# Patient Record
Sex: FEMALE | HISPANIC OR LATINO | URBAN - METROPOLITAN AREA
[De-identification: names, ages, dates, MRNs, and addresses within clinical notes are randomized per-mention and may not be internally consistent; named-entity substitution may affect disease eponyms.]

---

## 2023-05-10 ENCOUNTER — TELEPHONE (OUTPATIENT)
Dept: PRIMARY CARE | Facility: CLINIC | Age: 58
End: 2023-05-10

## 2023-05-24 ENCOUNTER — OFFICE VISIT (OUTPATIENT)
Dept: PRIMARY CARE | Facility: CLINIC | Age: 58
End: 2023-05-24
Payer: COMMERCIAL

## 2023-05-24 VITALS
TEMPERATURE: 98.6 F | DIASTOLIC BLOOD PRESSURE: 73 MMHG | HEART RATE: 66 BPM | OXYGEN SATURATION: 94 % | WEIGHT: 157 LBS | SYSTOLIC BLOOD PRESSURE: 109 MMHG

## 2023-05-24 DIAGNOSIS — E78.5 HYPERLIPIDEMIA, UNSPECIFIED HYPERLIPIDEMIA TYPE: ICD-10-CM

## 2023-05-24 DIAGNOSIS — F32.A ANXIETY AND DEPRESSION: ICD-10-CM

## 2023-05-24 DIAGNOSIS — I10 HYPERTENSION, UNSPECIFIED TYPE: ICD-10-CM

## 2023-05-24 DIAGNOSIS — Z00.00 HEALTHCARE MAINTENANCE: Primary | ICD-10-CM

## 2023-05-24 DIAGNOSIS — Z13.21 ENCOUNTER FOR VITAMIN DEFICIENCY SCREENING: ICD-10-CM

## 2023-05-24 DIAGNOSIS — Z13.6 SCREENING FOR CARDIOVASCULAR CONDITION: ICD-10-CM

## 2023-05-24 DIAGNOSIS — Z12.31 ENCOUNTER FOR SCREENING MAMMOGRAM FOR MALIGNANT NEOPLASM OF BREAST: ICD-10-CM

## 2023-05-24 DIAGNOSIS — R93.1 ELEVATED CORONARY ARTERY CALCIUM SCORE: ICD-10-CM

## 2023-05-24 DIAGNOSIS — F41.9 ANXIETY AND DEPRESSION: ICD-10-CM

## 2023-05-24 PROBLEM — R07.89 ATYPICAL CHEST PAIN: Status: RESOLVED | Noted: 2023-05-24 | Resolved: 2023-05-24

## 2023-05-24 PROBLEM — R31.29 HEMATURIA, MICROSCOPIC: Status: ACTIVE | Noted: 2023-05-24

## 2023-05-24 PROBLEM — N95.1 MENOPAUSAL FLUSHING: Status: ACTIVE | Noted: 2023-05-24

## 2023-05-24 PROCEDURE — 1036F TOBACCO NON-USER: CPT | Performed by: INTERNAL MEDICINE

## 2023-05-24 PROCEDURE — 3078F DIAST BP <80 MM HG: CPT | Performed by: INTERNAL MEDICINE

## 2023-05-24 PROCEDURE — 3074F SYST BP LT 130 MM HG: CPT | Performed by: INTERNAL MEDICINE

## 2023-05-24 PROCEDURE — 99386 PREV VISIT NEW AGE 40-64: CPT | Performed by: INTERNAL MEDICINE

## 2023-05-24 RX ORDER — NORETHINDRONE ACETATE AND ETHINYL ESTRADIOL .0025; .5 MG/1; MG/1
TABLET, FILM COATED ORAL
COMMUNITY
Start: 2023-03-30

## 2023-05-24 RX ORDER — ASPIRIN 81 MG/1
1 TABLET ORAL DAILY
COMMUNITY
Start: 2018-03-23

## 2023-05-24 RX ORDER — HYDROCHLOROTHIAZIDE 25 MG/1
TABLET ORAL
Qty: 90 TABLET | Refills: 3 | Status: SHIPPED | OUTPATIENT
Start: 2023-05-24 | End: 2023-08-30 | Stop reason: SDUPTHER

## 2023-05-24 RX ORDER — VITAMIN E (DL,TOCOPHERYL ACET) 180 MG
CAPSULE ORAL
COMMUNITY

## 2023-05-24 RX ORDER — LISINOPRIL 30 MG/1
TABLET ORAL
Qty: 90 TABLET | Refills: 1 | Status: SHIPPED | OUTPATIENT
Start: 2023-05-24 | End: 2023-08-30 | Stop reason: SDUPTHER

## 2023-05-24 RX ORDER — LISINOPRIL 30 MG/1
TABLET ORAL
COMMUNITY
Start: 2023-05-20 | End: 2023-05-24 | Stop reason: SDUPTHER

## 2023-05-24 RX ORDER — EPINEPHRINE 0.22MG
AEROSOL WITH ADAPTER (ML) INHALATION
COMMUNITY
Start: 2018-03-22

## 2023-05-24 RX ORDER — ATORVASTATIN CALCIUM 10 MG/1
40 TABLET, FILM COATED ORAL DAILY
COMMUNITY
Start: 2018-03-22 | End: 2023-05-24 | Stop reason: ALTCHOICE

## 2023-05-24 RX ORDER — FENOFIBRATE 48 MG/1
TABLET, FILM COATED ORAL
COMMUNITY
Start: 2023-05-20 | End: 2023-05-24 | Stop reason: SDUPTHER

## 2023-05-24 RX ORDER — HYDROCHLOROTHIAZIDE 25 MG/1
TABLET ORAL
COMMUNITY
Start: 2023-04-03 | End: 2023-05-24 | Stop reason: SDUPTHER

## 2023-05-24 RX ORDER — BUPROPION HYDROCHLORIDE 100 MG/1
TABLET, EXTENDED RELEASE ORAL
COMMUNITY
Start: 2018-03-22 | End: 2023-05-24 | Stop reason: SDUPTHER

## 2023-05-24 RX ORDER — BUPROPION HYDROCHLORIDE 100 MG/1
TABLET ORAL
COMMUNITY
Start: 2023-05-20 | End: 2023-05-24 | Stop reason: ALTCHOICE

## 2023-05-24 RX ORDER — PAROXETINE 30 MG/1
1 TABLET, FILM COATED ORAL DAILY
COMMUNITY
Start: 2018-03-22 | End: 2023-05-24 | Stop reason: SDUPTHER

## 2023-05-24 RX ORDER — ATORVASTATIN CALCIUM 40 MG/1
TABLET, FILM COATED ORAL
COMMUNITY
Start: 2023-05-20 | End: 2023-05-24 | Stop reason: SDUPTHER

## 2023-05-24 RX ORDER — BUPROPION HYDROCHLORIDE 100 MG/1
100 TABLET, EXTENDED RELEASE ORAL DAILY
Qty: 90 TABLET | Refills: 3 | Status: SHIPPED | OUTPATIENT
Start: 2023-05-24 | End: 2023-08-30 | Stop reason: SDUPTHER

## 2023-05-24 RX ORDER — PAROXETINE 30 MG/1
30 TABLET, FILM COATED ORAL DAILY
Qty: 90 TABLET | Refills: 3 | Status: SHIPPED
Start: 2023-05-24 | End: 2023-07-10 | Stop reason: ALTCHOICE

## 2023-05-24 RX ORDER — FENOFIBRATE 48 MG/1
TABLET, FILM COATED ORAL
Qty: 90 TABLET | Refills: 3 | Status: SHIPPED | OUTPATIENT
Start: 2023-05-24 | End: 2023-08-30 | Stop reason: SDUPTHER

## 2023-05-24 RX ORDER — ATORVASTATIN CALCIUM 40 MG/1
40 TABLET, FILM COATED ORAL DAILY
Qty: 90 TABLET | Refills: 1 | Status: SHIPPED | OUTPATIENT
Start: 2023-05-24 | End: 2023-08-30 | Stop reason: SDUPTHER

## 2023-05-24 RX ORDER — MULTIVITAMIN
1 TABLET ORAL
COMMUNITY

## 2023-05-24 ASSESSMENT — COLUMBIA-SUICIDE SEVERITY RATING SCALE - C-SSRS: 1. IN THE PAST MONTH, HAVE YOU WISHED YOU WERE DEAD OR WISHED YOU COULD GO TO SLEEP AND NOT WAKE UP?: NO

## 2023-05-24 ASSESSMENT — ENCOUNTER SYMPTOMS
CARDIOVASCULAR NEGATIVE: 1
EYES NEGATIVE: 1
HEMATOLOGIC/LYMPHATIC NEGATIVE: 1
NEUROLOGICAL NEGATIVE: 1
CONSTITUTIONAL NEGATIVE: 1
PSYCHIATRIC NEGATIVE: 1
GASTROINTESTINAL NEGATIVE: 1
ENDOCRINE NEGATIVE: 1
RESPIRATORY NEGATIVE: 1
MUSCULOSKELETAL NEGATIVE: 1

## 2023-05-24 ASSESSMENT — LIFESTYLE VARIABLES
HOW MANY STANDARD DRINKS CONTAINING ALCOHOL DO YOU HAVE ON A TYPICAL DAY: 1 OR 2
AUDIT-C TOTAL SCORE: 2
SKIP TO QUESTIONS 9-10: 0
HOW OFTEN DO YOU HAVE A DRINK CONTAINING ALCOHOL: MONTHLY OR LESS
HOW OFTEN DO YOU HAVE SIX OR MORE DRINKS ON ONE OCCASION: LESS THAN MONTHLY

## 2023-05-24 ASSESSMENT — PATIENT HEALTH QUESTIONNAIRE - PHQ9
1. LITTLE INTEREST OR PLEASURE IN DOING THINGS: NOT AT ALL
SUM OF ALL RESPONSES TO PHQ9 QUESTIONS 1 & 2: 0
2. FEELING DOWN, DEPRESSED OR HOPELESS: NOT AT ALL

## 2023-05-24 ASSESSMENT — PAIN SCALES - GENERAL: PAINLEVEL: 0-NO PAIN

## 2023-05-24 NOTE — PROGRESS NOTES
Subjective   Patient ID: Elizabeth Milligan is a 57 y.o. female who presents for New Patient Visit and Menopause (Hot flashes/).    HPI       Here for hot  flashes.     No period for 1  year.  Then cam  back x 2  .     Saw gyn.  They recommended us.  She never went.       Review of Systems   Constitutional: Negative.    HENT: Negative.     Eyes: Negative.    Respiratory: Negative.     Cardiovascular: Negative.    Gastrointestinal: Negative.    Endocrine: Negative.    Musculoskeletal: Negative.    Skin: Negative.    Neurological: Negative.    Hematological: Negative.    Psychiatric/Behavioral: Negative.     All other systems reviewed and are negative.      Objective   /73   Pulse 66   Temp 37 °C (98.6 °F)   Wt 71.2 kg (157 lb)   SpO2 94%     Physical Exam  Vitals and nursing note reviewed.   Constitutional:       Appearance: Normal appearance.   HENT:      Head: Normocephalic and atraumatic.      Right Ear: Tympanic membrane, ear canal and external ear normal.      Left Ear: Tympanic membrane, ear canal and external ear normal.      Nose: Nose normal.      Mouth/Throat:      Mouth: Mucous membranes are moist.      Pharynx: Oropharynx is clear.   Eyes:      Extraocular Movements: Extraocular movements intact.      Conjunctiva/sclera: Conjunctivae normal.      Pupils: Pupils are equal, round, and reactive to light.   Cardiovascular:      Rate and Rhythm: Normal rate and regular rhythm.      Pulses: Normal pulses.      Heart sounds: Normal heart sounds.   Pulmonary:      Effort: Pulmonary effort is normal.      Breath sounds: Normal breath sounds.   Abdominal:      General: Abdomen is flat. Bowel sounds are normal.      Palpations: Abdomen is soft.   Musculoskeletal:         General: Normal range of motion.      Cervical back: Neck supple.   Skin:     General: Skin is warm and dry.      Capillary Refill: Capillary refill takes 2 to 3 seconds.   Neurological:      General: No focal deficit present.       Mental Status: She is alert and oriented to person, place, and time. Mental status is at baseline.   Psychiatric:         Mood and Affect: Mood normal.         Behavior: Behavior normal.         Thought Content: Thought content normal.         Judgment: Judgment normal.         Assessment/Plan   Problem List Items Addressed This Visit          Medium    Elevated coronary artery calcium score    Relevant Medications    atorvastatin (Lipitor) 40 mg tablet    fenofibrate (Tricor) 48 mg tablet    lisinopril 30 mg tablet    Other Relevant Orders    CBC    Hyperlipidemia    Relevant Medications    atorvastatin (Lipitor) 40 mg tablet    fenofibrate (Tricor) 48 mg tablet    Other Relevant Orders    CBC    Hypertension    Relevant Medications    hydroCHLOROthiazide (HYDRODiuril) 25 mg tablet    lisinopril 30 mg tablet    Other Relevant Orders    CBC    Anxiety and depression    Relevant Medications    buPROPion SR (Wellbutrin SR) 100 mg 12 hr tablet    PARoxetine (Paxil) 30 mg tablet     Other Visit Diagnoses       Healthcare maintenance    -  Primary    Relevant Orders    Comprehensive Metabolic Panel    Lipid Panel    TSH with reflex to Free T4 if abnormal    Screening for cardiovascular condition        Relevant Orders    Lipid Panel    Encounter for vitamin deficiency screening        Relevant Orders    Vitamin D 1,25 Dihydroxy    Encounter for screening mammogram for malignant neoplasm of breast              Recommend she  follow up with gyn  for  vag  bleeding.

## 2023-07-10 DIAGNOSIS — F32.A ANXIETY AND DEPRESSION: Primary | ICD-10-CM

## 2023-07-10 DIAGNOSIS — F41.9 ANXIETY AND DEPRESSION: Primary | ICD-10-CM

## 2023-07-10 RX ORDER — PAROXETINE HYDROCHLORIDE 20 MG/1
TABLET, FILM COATED ORAL
Qty: 30 TABLET | Refills: 0 | Status: SHIPPED | OUTPATIENT
Start: 2023-07-10 | End: 2023-08-08

## 2023-08-07 DIAGNOSIS — F32.A ANXIETY AND DEPRESSION: ICD-10-CM

## 2023-08-07 DIAGNOSIS — F41.9 ANXIETY AND DEPRESSION: ICD-10-CM

## 2023-08-08 RX ORDER — PAROXETINE HYDROCHLORIDE 20 MG/1
20 TABLET, FILM COATED ORAL
Qty: 90 TABLET | Refills: 1 | Status: SHIPPED | OUTPATIENT
Start: 2023-08-08 | End: 2024-03-08

## 2023-08-30 DIAGNOSIS — R93.1 ELEVATED CORONARY ARTERY CALCIUM SCORE: ICD-10-CM

## 2023-08-30 DIAGNOSIS — F32.A ANXIETY AND DEPRESSION: ICD-10-CM

## 2023-08-30 DIAGNOSIS — E78.5 HYPERLIPIDEMIA, UNSPECIFIED HYPERLIPIDEMIA TYPE: ICD-10-CM

## 2023-08-30 DIAGNOSIS — I10 HYPERTENSION, UNSPECIFIED TYPE: ICD-10-CM

## 2023-08-30 DIAGNOSIS — F41.9 ANXIETY AND DEPRESSION: ICD-10-CM

## 2023-08-30 RX ORDER — ATORVASTATIN CALCIUM 40 MG/1
40 TABLET, FILM COATED ORAL DAILY
Qty: 90 TABLET | Refills: 1 | Status: SHIPPED | OUTPATIENT
Start: 2023-08-30 | End: 2024-03-08

## 2023-08-30 RX ORDER — FENOFIBRATE 48 MG/1
TABLET, FILM COATED ORAL
Qty: 90 TABLET | Refills: 3 | Status: SHIPPED | OUTPATIENT
Start: 2023-08-30

## 2023-08-30 RX ORDER — HYDROCHLOROTHIAZIDE 25 MG/1
TABLET ORAL
Qty: 90 TABLET | Refills: 3 | Status: SHIPPED
Start: 2023-08-30 | End: 2024-06-05 | Stop reason: SDUPTHER

## 2023-08-30 RX ORDER — LISINOPRIL 30 MG/1
TABLET ORAL
Qty: 90 TABLET | Refills: 1 | Status: SHIPPED | OUTPATIENT
Start: 2023-08-30 | End: 2024-04-02

## 2023-08-30 RX ORDER — BUPROPION HYDROCHLORIDE 100 MG/1
100 TABLET, EXTENDED RELEASE ORAL DAILY
Qty: 90 TABLET | Refills: 3 | Status: SHIPPED | OUTPATIENT
Start: 2023-08-30

## 2023-09-27 ENCOUNTER — HOSPITAL ENCOUNTER (OUTPATIENT)
Dept: DATA CONVERSION | Facility: HOSPITAL | Age: 58
Discharge: HOME | End: 2023-09-27
Payer: MEDICARE

## 2023-09-27 DIAGNOSIS — N93.9 ABNORMAL UTERINE AND VAGINAL BLEEDING, UNSPECIFIED: ICD-10-CM

## 2024-03-08 DIAGNOSIS — F32.A ANXIETY AND DEPRESSION: ICD-10-CM

## 2024-03-08 DIAGNOSIS — E78.5 HYPERLIPIDEMIA, UNSPECIFIED HYPERLIPIDEMIA TYPE: ICD-10-CM

## 2024-03-08 DIAGNOSIS — F41.9 ANXIETY AND DEPRESSION: ICD-10-CM

## 2024-03-08 DIAGNOSIS — R93.1 ELEVATED CORONARY ARTERY CALCIUM SCORE: ICD-10-CM

## 2024-03-08 RX ORDER — ATORVASTATIN CALCIUM 40 MG/1
40 TABLET, FILM COATED ORAL DAILY
Qty: 90 TABLET | Refills: 0 | Status: SHIPPED
Start: 2024-03-08 | End: 2024-06-07 | Stop reason: SDUPTHER

## 2024-03-08 RX ORDER — PAROXETINE HYDROCHLORIDE 20 MG/1
20 TABLET, FILM COATED ORAL
Qty: 90 TABLET | Refills: 0 | Status: SHIPPED
Start: 2024-03-08 | End: 2024-06-05 | Stop reason: SDUPTHER

## 2024-03-11 DIAGNOSIS — F41.9 ANXIETY AND DEPRESSION: ICD-10-CM

## 2024-03-11 DIAGNOSIS — R93.1 ELEVATED CORONARY ARTERY CALCIUM SCORE: ICD-10-CM

## 2024-03-11 DIAGNOSIS — E78.5 HYPERLIPIDEMIA, UNSPECIFIED HYPERLIPIDEMIA TYPE: ICD-10-CM

## 2024-03-11 DIAGNOSIS — F32.A ANXIETY AND DEPRESSION: ICD-10-CM

## 2024-03-11 RX ORDER — ATORVASTATIN CALCIUM 40 MG/1
40 TABLET, FILM COATED ORAL DAILY
Qty: 90 TABLET | Refills: 0 | OUTPATIENT
Start: 2024-03-11

## 2024-03-11 RX ORDER — PAROXETINE HYDROCHLORIDE 20 MG/1
20 TABLET, FILM COATED ORAL
Qty: 90 TABLET | Refills: 0 | OUTPATIENT
Start: 2024-03-11

## 2024-04-02 DIAGNOSIS — R93.1 ELEVATED CORONARY ARTERY CALCIUM SCORE: ICD-10-CM

## 2024-04-02 DIAGNOSIS — I10 HYPERTENSION, UNSPECIFIED TYPE: ICD-10-CM

## 2024-04-02 RX ORDER — LISINOPRIL 30 MG/1
TABLET ORAL
Qty: 90 TABLET | Refills: 0 | Status: SHIPPED | OUTPATIENT
Start: 2024-04-02 | End: 2024-04-03 | Stop reason: SDUPTHER

## 2024-04-03 ENCOUNTER — TELEMEDICINE (OUTPATIENT)
Dept: PRIMARY CARE | Facility: CLINIC | Age: 59
End: 2024-04-03
Payer: MEDICARE

## 2024-04-03 DIAGNOSIS — R93.1 ELEVATED CORONARY ARTERY CALCIUM SCORE: ICD-10-CM

## 2024-04-03 DIAGNOSIS — I10 HYPERTENSION, UNSPECIFIED TYPE: ICD-10-CM

## 2024-04-03 PROCEDURE — 99203 OFFICE O/P NEW LOW 30 MIN: CPT

## 2024-04-03 RX ORDER — LISINOPRIL 30 MG/1
TABLET ORAL
Qty: 90 TABLET | Refills: 0 | Status: SHIPPED | OUTPATIENT
Start: 2024-04-03

## 2024-04-03 ASSESSMENT — ENCOUNTER SYMPTOMS
HYPERTENSION: 1
HEADACHES: 0
PALPITATIONS: 0
SHORTNESS OF BREATH: 0

## 2024-04-03 NOTE — PROGRESS NOTES
On Demand Virtual Visit Patient Consent     This visit was completed via video conference. All issues as below were discussed and addressed but no physical exam was performed. If it was felt that the patient should be evaluated in clinic than they were directed there. The patient verbally consented to the visit.    An interactive audio and video telecommunication system which permits real time communications between the patient (at the originating site) and provider (at the distant site) was utilized to provide this telehealth service.   Verbal consent was requested and obtained from Elizabeth Ramírez (or parent if under 18) on this day 4/3 for a telehealth visit.   I have verbally confirmed with Elizabeth Ramírez (or parent if under 18) that they are physically located in the [facility] during this virtual visit.    Subjective   Patient ID: Elizabeth Ramírez is a 58 y.o. female who presents for Hypertension.    Hypertension  This is a chronic problem. Episode onset: 18years. The problem is unchanged. The problem is controlled. Pertinent negatives include no anxiety, chest pain, headaches, malaise/fatigue, palpitations, peripheral edema or shortness of breath. There are no associated agents to hypertension. Risk factors for coronary artery disease include sedentary lifestyle, obesity and post-menopausal state. Past treatments include ACE inhibitors. The current treatment provides significant improvement. There are no compliance problems.         Review of Systems   Constitutional:  Negative for malaise/fatigue.   Respiratory:  Negative for shortness of breath.    Cardiovascular:  Negative for chest pain and palpitations.   Neurological:  Negative for headaches.       Objective   There were no vitals taken for this visit. 125/83 taken by pt at home    Physical Exam pt seen from her home to be in no acute distress, refill appropriate and sent to pharmacy as requested.     Assessment/Plan   [place] was seen  today for hypertension.  Diagnoses and all orders for this visit:  Elevated coronary artery calcium score  -     lisinopril 30 mg tablet; TAKE ONE TABLET BY MOUTH DAILY  Hypertension, unspecified type  -     lisinopril 30 mg tablet; TAKE ONE TABLET BY MOUTH DAILY    Of note pt does already have follow-up with her pcp set for Donna

## 2024-05-02 ENCOUNTER — APPOINTMENT (OUTPATIENT)
Dept: CARDIOLOGY | Facility: CLINIC | Age: 59
End: 2024-05-02
Payer: MEDICARE

## 2024-05-10 ENCOUNTER — APPOINTMENT (OUTPATIENT)
Dept: CARDIOLOGY | Facility: CLINIC | Age: 59
End: 2024-05-10
Payer: MEDICARE

## 2024-05-30 ENCOUNTER — OFFICE VISIT (OUTPATIENT)
Dept: CARDIOLOGY | Facility: CLINIC | Age: 59
End: 2024-05-30
Payer: MEDICARE

## 2024-05-30 VITALS
HEART RATE: 71 BPM | RESPIRATION RATE: 16 BRPM | SYSTOLIC BLOOD PRESSURE: 115 MMHG | HEIGHT: 61 IN | DIASTOLIC BLOOD PRESSURE: 80 MMHG | WEIGHT: 161.8 LBS | OXYGEN SATURATION: 96 % | BODY MASS INDEX: 30.55 KG/M2

## 2024-05-30 DIAGNOSIS — I10 HYPERTENSION, UNSPECIFIED TYPE: ICD-10-CM

## 2024-05-30 DIAGNOSIS — E78.5 HYPERLIPIDEMIA, UNSPECIFIED HYPERLIPIDEMIA TYPE: Primary | ICD-10-CM

## 2024-05-30 DIAGNOSIS — R93.1 ELEVATED CORONARY ARTERY CALCIUM SCORE: ICD-10-CM

## 2024-05-30 PROCEDURE — 99203 OFFICE O/P NEW LOW 30 MIN: CPT | Performed by: INTERNAL MEDICINE

## 2024-05-30 PROCEDURE — 3079F DIAST BP 80-89 MM HG: CPT | Performed by: INTERNAL MEDICINE

## 2024-05-30 PROCEDURE — 99213 OFFICE O/P EST LOW 20 MIN: CPT | Performed by: INTERNAL MEDICINE

## 2024-05-30 PROCEDURE — 1036F TOBACCO NON-USER: CPT | Performed by: INTERNAL MEDICINE

## 2024-05-30 PROCEDURE — 93005 ELECTROCARDIOGRAM TRACING: CPT | Performed by: INTERNAL MEDICINE

## 2024-05-30 PROCEDURE — 93010 ELECTROCARDIOGRAM REPORT: CPT | Performed by: INTERNAL MEDICINE

## 2024-05-30 PROCEDURE — 3074F SYST BP LT 130 MM HG: CPT | Performed by: INTERNAL MEDICINE

## 2024-05-30 ASSESSMENT — ENCOUNTER SYMPTOMS
CONSTIPATION: 0
DYSURIA: 0
CHILLS: 0
HEADACHES: 0
FEVER: 0
COUGH: 0
DEPRESSION: 0
DIARRHEA: 0
ALTERED MENTAL STATUS: 0
WHEEZING: 0
MEMORY LOSS: 0
MYALGIAS: 0
BLOATING: 0
HEMATURIA: 0
NAUSEA: 0
HEMOPTYSIS: 0
ABDOMINAL PAIN: 0
FALLS: 0
VOMITING: 0

## 2024-05-30 ASSESSMENT — PATIENT HEALTH QUESTIONNAIRE - PHQ9
SUM OF ALL RESPONSES TO PHQ9 QUESTIONS 1 AND 2: 0
1. LITTLE INTEREST OR PLEASURE IN DOING THINGS: NOT AT ALL
2. FEELING DOWN, DEPRESSED OR HOPELESS: NOT AT ALL

## 2024-05-30 NOTE — PROGRESS NOTES
Chief Complaint   Patient presents with    CAC    Hypertension    Hyperlipidemia       HPI  59 yo F w/ h/o CAC, HTN, HPL, anx/dep, FHx CAD (mother, sisters) now here for cardiology consult. No chest pain. No dyspnea at rest. No WELLS. No orthopnea/PND. No palps. No LH/dizzy/syncope. +occ mild dep LE edema. No claudication. No cough.   She walks on TM 3d/wk with no symptoms.  ECG 5/17: SR (73)   ECG 5/24: SR (72), ?LAE  CCS 3/18: 222  CCS 5/20: 232  CCS 12/21: 289 (LM 0, , LCX 72, RCA 10), nl heart size, no peric eff, AsAo 3.0cm    Review of Systems   Constitutional: Negative for chills, fever and malaise/fatigue.   HENT:  Negative for hearing loss.    Eyes:  Negative for visual disturbance.   Respiratory:  Negative for cough, hemoptysis and wheezing.    Skin:  Negative for rash.   Musculoskeletal:  Negative for falls and myalgias.   Gastrointestinal:  Negative for bloating, abdominal pain, constipation, diarrhea, dysphagia, nausea and vomiting.   Genitourinary:  Negative for dysuria and hematuria.   Neurological:  Negative for headaches.   Psychiatric/Behavioral:  Negative for altered mental status, depression and memory loss.       Social History     Tobacco Use    Smoking status: Never    Smokeless tobacco: Never   Substance Use Topics    Alcohol use: Yes     Alcohol/week: 1.0 standard drink of alcohol     Types: 1 Cans of beer per week      Family History   Problem Relation Name Age of Onset    Heart disease Mother Brenda     Heart disease Sister Tisha     Heart disease Sister Manpreet     Heart disease Sister Maryjane       No Known Allergies     Current Outpatient Medications   Medication Instructions    aspirin 81 mg EC tablet 1 tablet, oral, Daily    atorvastatin (LIPITOR) 40 mg, oral, Daily    buPROPion SR (WELLBUTRIN SR) 100 mg, oral, Daily    coenzyme Q-10 100 mg capsule oral    fenofibrate (Tricor) 48 mg tablet Take one po daily    fish oil concentrate (Omega-3) 120-180 mg capsule oral    Fyavolv 0.5-2.5  mg-mcg tablet     hydroCHLOROthiazide (HYDRODiuril) 25 mg tablet Take one po  daily    lisinopril 30 mg tablet TAKE ONE TABLET BY MOUTH DAILY    magnesium oxide-Mg AA chelate 300 mg capsule Every 24 hours    multivitamin tablet 1 tablet, oral, Daily RT    PARoxetine (PAXIL) 20 mg, oral, Daily before breakfast    turmeric/turmeric ext/pepr ext (turmeric-turmeric ext-pepper) 500-3 mg capsule as directed      Vitals:    05/30/24 0903   BP: 115/80   Pulse: 71   Resp: 16   SpO2: 96%      Physical Exam  Constitutional:       Appearance: Normal appearance.   HENT:      Head: Normocephalic and atraumatic.      Nose: Nose normal.   Neck:      Vascular: No carotid bruit.   Cardiovascular:      Rate and Rhythm: Normal rate and regular rhythm.      Heart sounds: No murmur heard.  Pulmonary:      Effort: Pulmonary effort is normal.      Breath sounds: Normal breath sounds.   Abdominal:      Palpations: Abdomen is soft.      Tenderness: There is no abdominal tenderness.   Musculoskeletal:      Right lower leg: No edema.      Left lower leg: No edema.   Skin:     General: Skin is warm and dry.   Neurological:      General: No focal deficit present.      Mental Status: She is alert.   Psychiatric:         Mood and Affect: Mood normal.         Judgment: Judgment normal.        Results/Data  11/22 Cr 0.9, K 4.1, LDL 70, HDL 46, , Chol 137  5/17 Cr 0.92, K 4.5, LFT nl, LDL 70, HDL 43, , Chol 150, HGB 13.5,     Assessment/Plan   57 yo F w/ h/o CAC, HTN, HPL, anx/dep, FHx CAD (mother, sisters). Doing well. No cardiac symptoms including with TM 3d/wk.   -continue ASA 81 qd  -continue Lisinopril 30 every day  -continue Hydrochlorothiazide 25 every day  -continue Atorva 40 every day -> goal LDL <70  -recommend check lipids with next labs  -f/u 1 year (earlier if needed); pt prefers annual fu     Kai Tello MD

## 2024-06-05 DIAGNOSIS — F32.A ANXIETY AND DEPRESSION: ICD-10-CM

## 2024-06-05 DIAGNOSIS — F41.9 ANXIETY AND DEPRESSION: ICD-10-CM

## 2024-06-05 DIAGNOSIS — I10 HYPERTENSION, UNSPECIFIED TYPE: ICD-10-CM

## 2024-06-05 RX ORDER — HYDROCHLOROTHIAZIDE 25 MG/1
TABLET ORAL
Qty: 90 TABLET | Refills: 3 | Status: SHIPPED | OUTPATIENT
Start: 2024-06-05

## 2024-06-05 RX ORDER — PAROXETINE HYDROCHLORIDE 20 MG/1
20 TABLET, FILM COATED ORAL
Qty: 90 TABLET | Refills: 0 | Status: SHIPPED | OUTPATIENT
Start: 2024-06-05

## 2024-06-06 LAB
ATRIAL RATE: 72 BPM
P AXIS: 47 DEGREES
P OFFSET: 213 MS
P ONSET: 159 MS
PR INTERVAL: 128 MS
Q ONSET: 223 MS
QRS COUNT: 12 BEATS
QRS DURATION: 82 MS
QT INTERVAL: 398 MS
QTC CALCULATION(BAZETT): 435 MS
QTC FREDERICIA: 423 MS
R AXIS: 50 DEGREES
T AXIS: 39 DEGREES
T OFFSET: 422 MS
VENTRICULAR RATE: 72 BPM

## 2024-06-07 ENCOUNTER — TELEPHONE (OUTPATIENT)
Dept: PRIMARY CARE | Facility: CLINIC | Age: 59
End: 2024-06-07
Payer: MEDICARE

## 2024-06-07 DIAGNOSIS — E78.5 HYPERLIPIDEMIA, UNSPECIFIED HYPERLIPIDEMIA TYPE: ICD-10-CM

## 2024-06-07 DIAGNOSIS — R93.1 ELEVATED CORONARY ARTERY CALCIUM SCORE: ICD-10-CM

## 2024-06-07 RX ORDER — ATORVASTATIN CALCIUM 40 MG/1
40 TABLET, FILM COATED ORAL DAILY
Qty: 30 TABLET | Refills: 0 | Status: SHIPPED | OUTPATIENT
Start: 2024-06-07 | End: 2024-06-10 | Stop reason: SDUPTHER

## 2024-06-07 NOTE — TELEPHONE ENCOUNTER
Patient called Rx line and requested a refill for Atorvastatin. She is a former Dr. Gramajo patient and has an appointment with [facility] on 6/21/2024.  Pharmacy: [facility] [address]  Patient phone #: [phone]

## 2024-06-10 DIAGNOSIS — E78.5 HYPERLIPIDEMIA, UNSPECIFIED HYPERLIPIDEMIA TYPE: ICD-10-CM

## 2024-06-10 DIAGNOSIS — R93.1 ELEVATED CORONARY ARTERY CALCIUM SCORE: ICD-10-CM

## 2024-06-10 RX ORDER — ATORVASTATIN CALCIUM 40 MG/1
40 TABLET, FILM COATED ORAL DAILY
Qty: 90 TABLET | Refills: 0 | Status: SHIPPED | OUTPATIENT
Start: 2024-06-10 | End: 2025-06-10

## 2024-06-18 PROBLEM — R53.82 CHRONIC FATIGUE: Status: ACTIVE | Noted: 2024-06-18

## 2024-06-18 PROBLEM — G47.33 OBSTRUCTIVE SLEEP APNEA SYNDROME: Status: ACTIVE | Noted: 2024-06-18

## 2024-06-18 PROBLEM — R41.3 MEMORY CHANGE: Status: RESOLVED | Noted: 2024-06-18 | Resolved: 2024-06-18

## 2024-06-18 PROBLEM — N93.9 VAGINAL SPOTTING: Status: RESOLVED | Noted: 2024-06-18 | Resolved: 2024-06-18

## 2024-06-18 PROBLEM — N95.1 MENOPAUSAL FLUSHING: Status: RESOLVED | Noted: 2023-05-24 | Resolved: 2024-06-18

## 2024-06-18 PROBLEM — E78.00 HYPERCHOLESTEROLEMIA: Status: ACTIVE | Noted: 2023-05-24

## 2024-06-18 PROBLEM — F32.5 MAJOR DEPRESSIVE DISORDER IN REMISSION (CMS-HCC): Status: ACTIVE | Noted: 2024-06-18

## 2024-06-18 PROBLEM — D72.829 LEUKOCYTOSIS: Status: ACTIVE | Noted: 2024-06-18

## 2024-06-18 PROBLEM — N95.1 VAGINAL DRYNESS, MENOPAUSAL: Status: RESOLVED | Noted: 2024-06-18 | Resolved: 2024-06-18

## 2024-06-19 ENCOUNTER — APPOINTMENT (OUTPATIENT)
Dept: PRIMARY CARE | Facility: CLINIC | Age: 59
End: 2024-06-19
Payer: MEDICARE

## 2024-06-21 ENCOUNTER — APPOINTMENT (OUTPATIENT)
Dept: PRIMARY CARE | Facility: CLINIC | Age: 59
End: 2024-06-21
Payer: MEDICARE

## 2024-06-21 VITALS
SYSTOLIC BLOOD PRESSURE: 112 MMHG | HEART RATE: 70 BPM | BODY MASS INDEX: 30.02 KG/M2 | OXYGEN SATURATION: 91 % | WEIGHT: 159 LBS | DIASTOLIC BLOOD PRESSURE: 72 MMHG | TEMPERATURE: 98.6 F | HEIGHT: 61 IN

## 2024-06-21 DIAGNOSIS — N95.1 VASOMOTOR SYMPTOMS DUE TO MENOPAUSE: ICD-10-CM

## 2024-06-21 DIAGNOSIS — F32.5 MAJOR DEPRESSIVE DISORDER IN REMISSION, UNSPECIFIED WHETHER RECURRENT (CMS-HCC): ICD-10-CM

## 2024-06-21 DIAGNOSIS — Z13.21 ENCOUNTER FOR VITAMIN DEFICIENCY SCREENING: ICD-10-CM

## 2024-06-21 DIAGNOSIS — Z12.31 ENCOUNTER FOR SCREENING MAMMOGRAM FOR BREAST CANCER: ICD-10-CM

## 2024-06-21 DIAGNOSIS — E78.00 HYPERCHOLESTEROLEMIA: ICD-10-CM

## 2024-06-21 DIAGNOSIS — Z00.00 GENERAL MEDICAL EXAM: Primary | ICD-10-CM

## 2024-06-21 DIAGNOSIS — I10 ESSENTIAL HYPERTENSION: ICD-10-CM

## 2024-06-21 RX ORDER — VENLAFAXINE HYDROCHLORIDE 37.5 MG/1
37.5 CAPSULE, EXTENDED RELEASE ORAL DAILY
Qty: 30 CAPSULE | Refills: 1 | Status: SHIPPED | OUTPATIENT
Start: 2024-06-21 | End: 2024-08-20

## 2024-06-21 ASSESSMENT — ENCOUNTER SYMPTOMS
MYALGIAS: 0
RHINORRHEA: 0
UNEXPECTED WEIGHT CHANGE: 0
VOMITING: 0
TROUBLE SWALLOWING: 0
ARTHRALGIAS: 0
NUMBNESS: 0
CHILLS: 0
DYSPHORIC MOOD: 0
WEAKNESS: 0
NERVOUS/ANXIOUS: 0
COUGH: 0
HEADACHES: 0
DYSURIA: 0
FEVER: 0
NAUSEA: 0
ABDOMINAL PAIN: 0
HEMATURIA: 0
SORE THROAT: 0
BLOOD IN STOOL: 0
SHORTNESS OF BREATH: 0

## 2024-06-21 NOTE — PROGRESS NOTES
Subjective   Patient ID: Elizabeth Milligan is a 58 y.o. female who presents for New Patient Visit (CPE- since menopause has been gaining weight /Concerns about memory loss ).    HPI   Elizabeth Milligan is a new patient here to establish care and seen for her comprehensive physical exam. PMH, SH, FH, medications were reviewed and updated.     CHRONIC ISSUES:   HTN: On Lisinopril 30mg, hydrochlorothiazide 25mg. No medication side effects. Does not check home BP. Denies chest pain, heart palpitations, SOB, dizziness, headaches, changes of vision, syncope, leg swelling.     HLD: On Atorvastatin 40mg, Fenofibrate 48mg. Tolerating well.     Depression: Takes Paxil 20mg/Wellbutrin 100mg. Denies SI, HI, self-harm.     Weight gain, hot flashes.     IMMUNIZATIONS:   Influenza - declines  Tdap - declines  Zoster - declines     LUNG CANCER SCREENING (50-77 y.o. with 20+ pack year hx & current smoker or quit <15yrs ago)  NEVER smoker    COLORECTAL SCREENING (From 44 or 10yrs younger than family diagnosis)  Last colonoscopy - 05/2015  Next colonoscopy due - 10 years  Relevant FHx - None    GYN  LMP - Unknown, denies vaginal bleeding   Last Pap - 03/2023, NILM/-HPV   Next Pap due - 03/2028     MAMMOGRAM SCREENING (From age 39)   Last mammogram - 11/22  Next mammogram due - Today     Review of Systems   Constitutional:  Negative for chills, fever and unexpected weight change.   HENT:  Negative for congestion, ear pain, hearing loss, rhinorrhea, sore throat and trouble swallowing.    Eyes:  Negative for visual disturbance.   Respiratory:  Negative for cough and shortness of breath.    Cardiovascular:  Negative for chest pain and leg swelling.   Gastrointestinal:  Negative for abdominal pain, blood in stool, nausea and vomiting.   Genitourinary:  Negative for dysuria and hematuria.   Musculoskeletal:  Negative for arthralgias and myalgias.   Skin:  Negative for rash.   Neurological:  Negative for weakness, numbness and  "headaches.   Psychiatric/Behavioral:  Negative for dysphoric mood. The patient is not nervous/anxious.        Objective   /72 (BP Location: Left arm, Patient Position: Sitting, BP Cuff Size: Adult)   Pulse 70   Temp 37 °C (98.6 °F) (Oral)   Ht 1.549 m (5' 1\")   Wt 72.1 kg (159 lb)   SpO2 91%   BMI 30.04 kg/m²     Physical Exam  Vitals and nursing note reviewed.   Constitutional:       General: She is not in acute distress.  HENT:      Right Ear: Tympanic membrane and ear canal normal.      Left Ear: Tympanic membrane and ear canal normal.      Nose: Nose normal.      Mouth/Throat:      Mouth: Mucous membranes are moist.   Eyes:      Extraocular Movements: Extraocular movements intact.      Pupils: Pupils are equal, round, and reactive to light.   Neck:      Vascular: No carotid bruit.   Cardiovascular:      Rate and Rhythm: Normal rate and regular rhythm.   Pulmonary:      Effort: Pulmonary effort is normal.      Breath sounds: Normal breath sounds.   Abdominal:      General: Abdomen is flat.      Palpations: Abdomen is soft.      Tenderness: There is no abdominal tenderness.   Musculoskeletal:         General: Normal range of motion.   Lymphadenopathy:      Cervical: No cervical adenopathy.   Skin:     General: Skin is warm.      Findings: No rash.   Neurological:      General: No focal deficit present.      Mental Status: She is alert.   Psychiatric:         Mood and Affect: Mood normal.         Assessment/Plan   Problem List Items Addressed This Visit             ICD-10-CM    Hypercholesterolemia E78.00     Chronic.  Continue Lipitor 40 mg, Fenofibrate 48 mg daily.  Low-fat diet and increase in activity.  Labs ordered, will follow-up with results.  Recheck in 6 months.         Relevant Orders    Lipid Panel (Completed)    Essential hypertension I10     Chronic.  Continue hydrochlorothiazide 25 mg, lisinopril 30 mg.  DASH diet and increase in activity.  Labs ordered, will follow-up results.  Recheck in " 6 months.         Relevant Orders    Comprehensive metabolic panel (Completed)    Urinalysis with Reflex Microscopic (Completed)    Albumin , Urine Random (Completed)    Major depressive disorder in remission (CMS-HCC) F32.5     Chronic.  Will wean off Paxil due to complaints of weight gain, take every other day for 1 week.  Start Effexor 37.5 mg daily. Risks and benefits of medication discussed and prescribed.   Continue Wellbutrin 100 mg daily.  Recheck in 1 month.         Relevant Medications    venlafaxine XR (Effexor-XR) 37.5 mg 24 hr capsule     Other Visit Diagnoses         Codes    General medical exam    -  Primary  Preventative measures discussed. Labs ordered, will follow-up with results.  Immunizations discussed. Z00.00    Relevant Orders    CBC and Auto Differential (Completed)    Tsh With Reflex To Free T4 If Abnormal (Completed)    Encounter for screening mammogram for breast cancer     Z12.31    Relevant Orders    BI mammo bilateral diagnostic    Encounter for vitamin deficiency screening     Z13.21    Relevant Orders    Vitamin D 25-Hydroxy,Total (for eval of Vitamin D levels) (Completed)    Vasomotor symptoms due to menopause     N95.1    Relevant Medications    venlafaxine XR (Effexor-XR) 37.5 mg 24 hr capsule

## 2024-06-21 NOTE — PATIENT INSTRUCTIONS
Start Effexor tomorrow.   Take Paxil Sunday, Tuesday, Thursday, Saturday and then stop completely.  Continue Wellbutrin 100mg daily.     Complete lab work, fasting for 12 hours.   Follow up in 1 month.

## 2024-06-22 ENCOUNTER — LAB (OUTPATIENT)
Dept: LAB | Facility: LAB | Age: 59
End: 2024-06-22
Payer: MEDICARE

## 2024-06-22 DIAGNOSIS — Z00.00 GENERAL MEDICAL EXAM: ICD-10-CM

## 2024-06-22 DIAGNOSIS — E78.00 HYPERCHOLESTEROLEMIA: ICD-10-CM

## 2024-06-22 DIAGNOSIS — Z13.21 ENCOUNTER FOR VITAMIN DEFICIENCY SCREENING: ICD-10-CM

## 2024-06-22 DIAGNOSIS — Z13.1 SCREENING FOR DIABETES MELLITUS: Primary | ICD-10-CM

## 2024-06-22 DIAGNOSIS — I10 ESSENTIAL HYPERTENSION: ICD-10-CM

## 2024-06-22 LAB
25(OH)D3 SERPL-MCNC: 47 NG/ML (ref 31–100)
ALBUMIN SERPL-MCNC: 4.3 G/DL (ref 3.5–5)
ALP BLD-CCNC: 72 U/L (ref 35–125)
ALT SERPL-CCNC: 18 U/L (ref 5–40)
ANION GAP SERPL CALC-SCNC: 11 MMOL/L
APPEARANCE UR: CLEAR
AST SERPL-CCNC: 17 U/L (ref 5–40)
BASOPHILS # BLD AUTO: 0.08 X10*3/UL (ref 0–0.1)
BASOPHILS NFR BLD AUTO: 0.8 %
BILIRUB SERPL-MCNC: 0.4 MG/DL (ref 0.1–1.2)
BILIRUB UR STRIP.AUTO-MCNC: NEGATIVE MG/DL
BUN SERPL-MCNC: 21 MG/DL (ref 8–25)
CALCIUM SERPL-MCNC: 9.7 MG/DL (ref 8.5–10.4)
CHLORIDE SERPL-SCNC: 102 MMOL/L (ref 97–107)
CHOLEST SERPL-MCNC: 144 MG/DL (ref 133–200)
CHOLEST/HDLC SERPL: 3.4 {RATIO}
CO2 SERPL-SCNC: 29 MMOL/L (ref 24–31)
COLOR UR: ABNORMAL
CREAT SERPL-MCNC: 1 MG/DL (ref 0.4–1.6)
CREAT UR-MCNC: 103.1 MG/DL
EGFRCR SERPLBLD CKD-EPI 2021: 65 ML/MIN/1.73M*2
EOSINOPHIL # BLD AUTO: 0.4 X10*3/UL (ref 0–0.7)
EOSINOPHIL NFR BLD AUTO: 4.2 %
ERYTHROCYTE [DISTWIDTH] IN BLOOD BY AUTOMATED COUNT: 14.1 % (ref 11.5–14.5)
GLUCOSE SERPL-MCNC: 107 MG/DL (ref 65–99)
GLUCOSE UR STRIP.AUTO-MCNC: NORMAL MG/DL
HCT VFR BLD AUTO: 42 % (ref 36–46)
HDLC SERPL-MCNC: 42 MG/DL
HGB BLD-MCNC: 13.4 G/DL (ref 12–16)
IMM GRANULOCYTES # BLD AUTO: 0.02 X10*3/UL (ref 0–0.7)
IMM GRANULOCYTES NFR BLD AUTO: 0.2 % (ref 0–0.9)
KETONES UR STRIP.AUTO-MCNC: NEGATIVE MG/DL
LDLC SERPL CALC-MCNC: 81 MG/DL (ref 65–130)
LEUKOCYTE ESTERASE UR QL STRIP.AUTO: NEGATIVE
LYMPHOCYTES # BLD AUTO: 2.62 X10*3/UL (ref 1.2–4.8)
LYMPHOCYTES NFR BLD AUTO: 27.7 %
MCH RBC QN AUTO: 27.7 PG (ref 26–34)
MCHC RBC AUTO-ENTMCNC: 31.9 G/DL (ref 32–36)
MCV RBC AUTO: 87 FL (ref 80–100)
MICROALBUMIN UR-MCNC: <12 MG/L (ref 0–23)
MICROALBUMIN/CREAT UR: NORMAL MG/G{CREAT}
MONOCYTES # BLD AUTO: 0.59 X10*3/UL (ref 0.1–1)
MONOCYTES NFR BLD AUTO: 6.2 %
NEUTROPHILS # BLD AUTO: 5.74 X10*3/UL (ref 1.2–7.7)
NEUTROPHILS NFR BLD AUTO: 60.9 %
NITRITE UR QL STRIP.AUTO: NEGATIVE
NRBC BLD-RTO: 0 /100 WBCS (ref 0–0)
PH UR STRIP.AUTO: 5.5 [PH]
PLATELET # BLD AUTO: 381 X10*3/UL (ref 150–450)
POTASSIUM SERPL-SCNC: 4 MMOL/L (ref 3.4–5.1)
PROT SERPL-MCNC: 6.8 G/DL (ref 5.9–7.9)
PROT UR STRIP.AUTO-MCNC: NEGATIVE MG/DL
RBC # BLD AUTO: 4.84 X10*6/UL (ref 4–5.2)
RBC # UR STRIP.AUTO: ABNORMAL /UL
RBC #/AREA URNS AUTO: NORMAL /HPF
SODIUM SERPL-SCNC: 142 MMOL/L (ref 133–145)
SP GR UR STRIP.AUTO: 1.01
SQUAMOUS #/AREA URNS AUTO: NORMAL /HPF
TRIGL SERPL-MCNC: 107 MG/DL (ref 40–150)
TSH SERPL DL<=0.05 MIU/L-ACNC: 2.44 MIU/L (ref 0.27–4.2)
UROBILINOGEN UR STRIP.AUTO-MCNC: NORMAL MG/DL
WBC # BLD AUTO: 9.5 X10*3/UL (ref 4.4–11.3)
WBC #/AREA URNS AUTO: NORMAL /HPF

## 2024-06-22 PROCEDURE — 82043 UR ALBUMIN QUANTITATIVE: CPT

## 2024-06-22 PROCEDURE — 82306 VITAMIN D 25 HYDROXY: CPT

## 2024-06-22 PROCEDURE — 83036 HEMOGLOBIN GLYCOSYLATED A1C: CPT

## 2024-06-22 PROCEDURE — 36415 COLL VENOUS BLD VENIPUNCTURE: CPT

## 2024-06-22 PROCEDURE — 84443 ASSAY THYROID STIM HORMONE: CPT

## 2024-06-22 PROCEDURE — 85025 COMPLETE CBC W/AUTO DIFF WBC: CPT

## 2024-06-22 PROCEDURE — 81001 URINALYSIS AUTO W/SCOPE: CPT

## 2024-06-22 PROCEDURE — 82570 ASSAY OF URINE CREATININE: CPT

## 2024-06-22 PROCEDURE — 80061 LIPID PANEL: CPT

## 2024-06-22 PROCEDURE — 80053 COMPREHEN METABOLIC PANEL: CPT

## 2024-06-24 LAB
EST. AVERAGE GLUCOSE BLD GHB EST-MCNC: 120 MG/DL
HBA1C MFR BLD: 5.8 %

## 2024-07-05 NOTE — ASSESSMENT & PLAN NOTE
Chronic.  Will wean off Paxil due to complaints of weight gain, take every other day for 1 week.  Start Effexor 37.5 mg daily. Risks and benefits of medication discussed and prescribed.   Continue Wellbutrin 100 mg daily.  Recheck in 1 month.

## 2024-07-05 NOTE — ASSESSMENT & PLAN NOTE
Chronic.  Continue Lipitor 40 mg, Fenofibrate 48 mg daily.  Low-fat diet and increase in activity.  Labs ordered, will follow-up with results.  Recheck in 6 months.

## 2024-07-05 NOTE — ASSESSMENT & PLAN NOTE
Chronic.  Continue hydrochlorothiazide 25 mg, lisinopril 30 mg.  DASH diet and increase in activity.  Labs ordered, will follow-up results.  Recheck in 6 months.

## 2024-07-11 DIAGNOSIS — E78.5 HYPERLIPIDEMIA, UNSPECIFIED HYPERLIPIDEMIA TYPE: ICD-10-CM

## 2024-07-11 DIAGNOSIS — R93.1 ELEVATED CORONARY ARTERY CALCIUM SCORE: ICD-10-CM

## 2024-07-11 RX ORDER — ATORVASTATIN CALCIUM 40 MG/1
40 TABLET, FILM COATED ORAL DAILY
Qty: 90 TABLET | Refills: 0 | Status: SHIPPED | OUTPATIENT
Start: 2024-07-11 | End: 2024-10-09

## 2024-08-02 ENCOUNTER — APPOINTMENT (OUTPATIENT)
Dept: PRIMARY CARE | Facility: CLINIC | Age: 59
End: 2024-08-02
Payer: MEDICARE

## 2024-08-02 VITALS
HEART RATE: 79 BPM | BODY MASS INDEX: 30.02 KG/M2 | WEIGHT: 159 LBS | DIASTOLIC BLOOD PRESSURE: 60 MMHG | HEIGHT: 61 IN | SYSTOLIC BLOOD PRESSURE: 110 MMHG | OXYGEN SATURATION: 97 %

## 2024-08-02 DIAGNOSIS — F32.5 MAJOR DEPRESSIVE DISORDER IN REMISSION, UNSPECIFIED WHETHER RECURRENT (CMS-HCC): ICD-10-CM

## 2024-08-02 DIAGNOSIS — R73.03 PRE-DIABETES: ICD-10-CM

## 2024-08-02 DIAGNOSIS — N95.1 VASOMOTOR SYMPTOMS DUE TO MENOPAUSE: Primary | ICD-10-CM

## 2024-08-02 DIAGNOSIS — E66.9 OBESITY (BMI 30-39.9): ICD-10-CM

## 2024-08-02 PROCEDURE — 3074F SYST BP LT 130 MM HG: CPT

## 2024-08-02 PROCEDURE — 3008F BODY MASS INDEX DOCD: CPT

## 2024-08-02 PROCEDURE — 3078F DIAST BP <80 MM HG: CPT

## 2024-08-02 PROCEDURE — 99214 OFFICE O/P EST MOD 30 MIN: CPT

## 2024-08-02 PROCEDURE — 1036F TOBACCO NON-USER: CPT

## 2024-08-02 RX ORDER — VENLAFAXINE HYDROCHLORIDE 75 MG/1
75 CAPSULE, EXTENDED RELEASE ORAL DAILY
Qty: 60 CAPSULE | Refills: 0 | Status: SHIPPED | OUTPATIENT
Start: 2024-08-02 | End: 2024-10-01

## 2024-08-02 ASSESSMENT — ENCOUNTER SYMPTOMS
LIGHT-HEADEDNESS: 0
CHILLS: 0
NERVOUS/ANXIOUS: 0
VOMITING: 0
DIZZINESS: 0
FEVER: 0
PALPITATIONS: 0
NAUSEA: 0
SHORTNESS OF BREATH: 0

## 2024-08-02 ASSESSMENT — PATIENT HEALTH QUESTIONNAIRE - PHQ9
1. LITTLE INTEREST OR PLEASURE IN DOING THINGS: NOT AT ALL
SUM OF ALL RESPONSES TO PHQ9 QUESTIONS 1 AND 2: 0
2. FEELING DOWN, DEPRESSED OR HOPELESS: NOT AT ALL

## 2024-08-02 ASSESSMENT — COLUMBIA-SUICIDE SEVERITY RATING SCALE - C-SSRS: 1. IN THE PAST MONTH, HAVE YOU WISHED YOU WERE DEAD OR WISHED YOU COULD GO TO SLEEP AND NOT WAKE UP?: NO

## 2024-08-02 ASSESSMENT — PAIN SCALES - GENERAL: PAINLEVEL: 0-NO PAIN

## 2024-08-02 NOTE — ASSESSMENT & PLAN NOTE
Chronic, keep off medication at this time.  Low carbohydrate diet and increase in activity, goal of 30 minutes of exercise 4-5 times per week.  Will look into GLP-1 coverage for weight loss, we discussed starting metformin if GLP-1 is not covered.  Recheck in 6 months.

## 2024-08-02 NOTE — PROGRESS NOTES
"Subjective   Patient ID: Elizabeth Milligan is a 58 y.o. female who presents for Follow-up (Weight and anxiety /Patient states her anxiety isn't doing better and she is still having hot flashes ).    HPI   Depression: Paxil discontinued, patient started on Effexor XR 37.5mg. Also on Wellbutrin 100mg. Tolerating well. Patient reports depression symptoms are stable, has not noticed improvement in hot flashes. Denies SI, HI, self-harm.    Weight gain/Pre-diabetes: A1c 5.8. Reports diet low in carbohydrates, fast food, fried food. Walks on treadmill 30 minutes 3x/week. Interested in medication.     Review of Systems   Constitutional:  Negative for chills and fever.   Respiratory:  Negative for shortness of breath.    Cardiovascular:  Negative for chest pain and palpitations.   Gastrointestinal:  Negative for nausea and vomiting.   Neurological:  Negative for dizziness and light-headedness.   Psychiatric/Behavioral:  Negative for self-injury and suicidal ideas. The patient is not nervous/anxious.      Objective   /60   Pulse 79   Ht 1.549 m (5' 1\")   Wt 72.1 kg (159 lb)   SpO2 97%   BMI 30.04 kg/m²     Physical Exam  Vitals and nursing note reviewed.   Constitutional:       General: She is not in acute distress.  Eyes:      Extraocular Movements: Extraocular movements intact.      Conjunctiva/sclera: Conjunctivae normal.   Cardiovascular:      Rate and Rhythm: Normal rate and regular rhythm.   Pulmonary:      Effort: Pulmonary effort is normal.      Breath sounds: Normal breath sounds.   Musculoskeletal:      Cervical back: Neck supple.      Right lower leg: No edema.      Left lower leg: No edema.   Neurological:      General: No focal deficit present.      Mental Status: She is alert.   Psychiatric:         Mood and Affect: Mood normal.       Assessment/Plan   Problem List Items Addressed This Visit             ICD-10-CM    Major depressive disorder in remission (CMS-HCC) F32.5     Chronic, stable.  " Continue Wellbutrin 100 mg daily.  Increase Effexor XR to 75 mg daily to assist with vasomotor symptoms.  Recheck in 4 to 6 weeks.         Relevant Medications    venlafaxine XR (Effexor-XR) 75 mg 24 hr capsule    Vasomotor symptoms due to menopause - Primary N95.1     Chronic, needs better control.  Increase Effexor XR to 75 mg daily. Risks and benefits of medication discussed and prescribed.   Recheck in 4-6 weeks.          Relevant Medications    venlafaxine XR (Effexor-XR) 75 mg 24 hr capsule    Pre-diabetes R73.03     Chronic, keep off medication at this time.  Low carbohydrate diet and increase in activity, goal of 30 minutes of exercise 4-5 times per week.  Will look into GLP-1 coverage for weight loss, we discussed starting metformin if GLP-1 is not covered.  Recheck in 6 months.          Other Visit Diagnoses         Codes    Obesity (BMI 30-39.9)      Diet low in carbohydrates, fast food, fried food and increase in lean protein, vegetables and exercise. Goal of 30 minutes of exercise 4-5 times per week.  Will look into GLP-1 coverage for weight loss, we discussed starting metformin if GLP-1 is not covered. E66.9

## 2024-08-02 NOTE — ASSESSMENT & PLAN NOTE
Chronic, needs better control.  Increase Effexor XR to 75 mg daily. Risks and benefits of medication discussed and prescribed.   Recheck in 4-6 weeks.

## 2024-08-02 NOTE — ASSESSMENT & PLAN NOTE
Chronic, stable.  Continue Wellbutrin 100 mg daily.  Increase Effexor XR to 75 mg daily to assist with vasomotor symptoms.  Recheck in 4 to 6 weeks.

## 2024-08-06 ENCOUNTER — TELEPHONE (OUTPATIENT)
Dept: PRIMARY CARE | Facility: CLINIC | Age: 59
End: 2024-08-06
Payer: MEDICARE

## 2024-08-06 NOTE — TELEPHONE ENCOUNTER
Left VM for patient to call office. Wegovy/Zepbound are not covered by patient's insurance.   ----- Message from Tomasa Alonso sent at 8/6/2024 11:24 AM EDT -----  Sorry for the delay.  Both medications are excluded from her plan.  ----- Message -----  From: Dana Johnston PA-C  Sent: 8/2/2024   8:55 AM EDT  To: Tomasa Alonso [facility]    Can you please run test claim for Wegovy/Zepbound. Thank you.

## 2024-08-14 ENCOUNTER — TELEPHONE (OUTPATIENT)
Dept: PRIMARY CARE | Facility: CLINIC | Age: 59
End: 2024-08-14
Payer: MEDICARE

## 2024-08-19 ENCOUNTER — TELEPHONE (OUTPATIENT)
Dept: PRIMARY CARE | Facility: CLINIC | Age: 59
End: 2024-08-19
Payer: MEDICARE

## 2024-08-19 NOTE — TELEPHONE ENCOUNTER
Spoke with patient regarding weight loss medications. Wegovy and Zepbound not covered by insurance plan. Informed her that compounded medications offered by [facility] clinics are not yet FDA approved. Patient understands and voiced appreciation for the call.

## 2024-08-19 NOTE — TELEPHONE ENCOUNTER
----- Message from Lesly WANG sent at 8/16/2024  9:50 AM EDT -----  Patent called returning call [phone]

## 2024-08-22 ENCOUNTER — E-VISIT (OUTPATIENT)
Dept: PRIMARY CARE | Facility: CLINIC | Age: 59
End: 2024-08-22
Payer: MEDICARE

## 2024-08-23 ENCOUNTER — TELEPHONE (OUTPATIENT)
Dept: PRIMARY CARE | Facility: CLINIC | Age: 59
End: 2024-08-23
Payer: MEDICARE

## 2024-08-31 DIAGNOSIS — N95.1 VASOMOTOR SYMPTOMS DUE TO MENOPAUSE: ICD-10-CM

## 2024-08-31 DIAGNOSIS — F32.5 MAJOR DEPRESSIVE DISORDER IN REMISSION, UNSPECIFIED WHETHER RECURRENT (CMS-HCC): ICD-10-CM

## 2024-09-03 RX ORDER — VENLAFAXINE HYDROCHLORIDE 75 MG/1
75 CAPSULE, EXTENDED RELEASE ORAL DAILY
Qty: 60 CAPSULE | Refills: 0 | OUTPATIENT
Start: 2024-09-03 | End: 2024-11-02

## 2024-09-06 ENCOUNTER — APPOINTMENT (OUTPATIENT)
Dept: PRIMARY CARE | Facility: CLINIC | Age: 59
End: 2024-09-06
Payer: MEDICARE

## 2024-09-06 ENCOUNTER — TELEPHONE (OUTPATIENT)
Dept: PRIMARY CARE | Facility: CLINIC | Age: 59
End: 2024-09-06

## 2024-09-06 VITALS
BODY MASS INDEX: 30.58 KG/M2 | SYSTOLIC BLOOD PRESSURE: 120 MMHG | HEIGHT: 61 IN | OXYGEN SATURATION: 96 % | WEIGHT: 162 LBS | DIASTOLIC BLOOD PRESSURE: 80 MMHG | HEART RATE: 78 BPM

## 2024-09-06 DIAGNOSIS — R73.03 PRE-DIABETES: Primary | ICD-10-CM

## 2024-09-06 DIAGNOSIS — R93.1 ELEVATED CORONARY ARTERY CALCIUM SCORE: ICD-10-CM

## 2024-09-06 DIAGNOSIS — I10 ESSENTIAL HYPERTENSION: ICD-10-CM

## 2024-09-06 DIAGNOSIS — N95.1 VASOMOTOR SYMPTOMS DUE TO MENOPAUSE: ICD-10-CM

## 2024-09-06 DIAGNOSIS — Z87.448 HISTORY OF HEMATURIA: ICD-10-CM

## 2024-09-06 DIAGNOSIS — E78.5 HYPERLIPIDEMIA, UNSPECIFIED HYPERLIPIDEMIA TYPE: ICD-10-CM

## 2024-09-06 DIAGNOSIS — F32.5 MAJOR DEPRESSIVE DISORDER IN REMISSION, UNSPECIFIED WHETHER RECURRENT (CMS-HCC): Primary | ICD-10-CM

## 2024-09-06 DIAGNOSIS — E78.00 HYPERCHOLESTEROLEMIA: ICD-10-CM

## 2024-09-06 LAB
APPEARANCE UR: ABNORMAL
BILIRUB UR STRIP.AUTO-MCNC: NEGATIVE MG/DL
COLOR UR: YELLOW
GLUCOSE UR STRIP.AUTO-MCNC: NORMAL MG/DL
KETONES UR STRIP.AUTO-MCNC: NEGATIVE MG/DL
LEUKOCYTE ESTERASE UR QL STRIP.AUTO: NEGATIVE
NITRITE UR QL STRIP.AUTO: NEGATIVE
PH UR STRIP.AUTO: 7.5 [PH]
PROT UR STRIP.AUTO-MCNC: NEGATIVE MG/DL
RBC # UR STRIP.AUTO: NEGATIVE /UL
SP GR UR STRIP.AUTO: 1.02
UROBILINOGEN UR STRIP.AUTO-MCNC: NORMAL MG/DL

## 2024-09-06 PROCEDURE — 3074F SYST BP LT 130 MM HG: CPT

## 2024-09-06 PROCEDURE — 99213 OFFICE O/P EST LOW 20 MIN: CPT

## 2024-09-06 PROCEDURE — 81003 URINALYSIS AUTO W/O SCOPE: CPT

## 2024-09-06 PROCEDURE — 3008F BODY MASS INDEX DOCD: CPT

## 2024-09-06 PROCEDURE — 3079F DIAST BP 80-89 MM HG: CPT

## 2024-09-06 RX ORDER — FENOFIBRATE 48 MG/1
48 TABLET, FILM COATED ORAL DAILY
Qty: 90 TABLET | Refills: 0 | Status: SHIPPED | OUTPATIENT
Start: 2024-09-06 | End: 2024-12-05

## 2024-09-06 ASSESSMENT — PATIENT HEALTH QUESTIONNAIRE - PHQ9
1. LITTLE INTEREST OR PLEASURE IN DOING THINGS: NOT AT ALL
2. FEELING DOWN, DEPRESSED OR HOPELESS: NOT AT ALL
SUM OF ALL RESPONSES TO PHQ9 QUESTIONS 1 AND 2: 0

## 2024-09-06 ASSESSMENT — COLUMBIA-SUICIDE SEVERITY RATING SCALE - C-SSRS: 1. IN THE PAST MONTH, HAVE YOU WISHED YOU WERE DEAD OR WISHED YOU COULD GO TO SLEEP AND NOT WAKE UP?: NO

## 2024-09-06 ASSESSMENT — PAIN SCALES - GENERAL: PAINLEVEL: 0-NO PAIN

## 2024-09-06 NOTE — PROGRESS NOTES
"Subjective   Patient ID: Elizabeth Millgian is a 59 y.o. female who presents for Follow-up (Hot flashes 3 times a day for the past year /Depression is under control ).    HPI   [place] is seen for follow up depression and hot flashes. On Effexor XR 75mg. Also on Wellbutrin 100mg. Tolerating well. Depression controlled, reports slight improvement in hot flashes, still wishes for more improvement. Denies chest pain, SOB, nausea, vomiting, SI, HI, self-harm.     Review of Systems  All other systems have been reviewed and are negative except as noted in the HPI.     Objective   /80   Pulse 78   Ht 1.549 m (5' 1\")   Wt 73.5 kg (162 lb)   SpO2 96%   BMI 30.61 kg/m²     Physical Exam  Vitals and nursing note reviewed.   Constitutional:       General: She is not in acute distress.     Appearance: Normal appearance.   Eyes:      Extraocular Movements: Extraocular movements intact.      Conjunctiva/sclera: Conjunctivae normal.   Cardiovascular:      Rate and Rhythm: Normal rate and regular rhythm.   Pulmonary:      Effort: Pulmonary effort is normal.      Breath sounds: Normal breath sounds.   Musculoskeletal:      Cervical back: Neck supple.   Neurological:      General: No focal deficit present.      Mental Status: She is alert.   Psychiatric:         Mood and Affect: Mood normal.         Assessment/Plan   Assessment & Plan  Major depressive disorder in remission, unspecified whether recurrent (CMS-HCC)  Chronic, well controlled. Continue Effexor XR 75mg, Wellbutrin SR 100mg.        Vasomotor symptoms due to menopause  Chronic, needs better control. Continue Effexor 75mg at this time. Recommend follow up with GYN to discuss additional treatment options.       History of hematuria  Acute.   Repeat UA ordered, will follow up with results.   Orders:    Urinalysis with Reflex Microscopic    Hyperlipidemia, unspecified hyperlipidemia type    Orders:    fenofibrate (Tricor) 48 mg tablet; Take 1 tablet (48 mg) by " mouth once daily.    Elevated coronary artery calcium score    Orders:    fenofibrate (Tricor) 48 mg tablet; Take 1 tablet (48 mg) by mouth once daily.

## 2024-09-06 NOTE — ASSESSMENT & PLAN NOTE
Chronic, needs better control. Continue Effexor 75mg at this time. Recommend follow up with GYN to discuss additional treatment options.

## 2024-10-01 ENCOUNTER — PATIENT MESSAGE (OUTPATIENT)
Dept: PRIMARY CARE | Facility: CLINIC | Age: 59
End: 2024-10-01
Payer: MEDICARE

## 2024-10-01 DIAGNOSIS — F41.9 ANXIETY AND DEPRESSION: ICD-10-CM

## 2024-10-01 DIAGNOSIS — F32.5 MAJOR DEPRESSIVE DISORDER IN REMISSION, UNSPECIFIED WHETHER RECURRENT (CMS-HCC): ICD-10-CM

## 2024-10-01 DIAGNOSIS — I10 HYPERTENSION, UNSPECIFIED TYPE: ICD-10-CM

## 2024-10-01 DIAGNOSIS — N95.1 VASOMOTOR SYMPTOMS DUE TO MENOPAUSE: ICD-10-CM

## 2024-10-01 DIAGNOSIS — F32.A ANXIETY AND DEPRESSION: ICD-10-CM

## 2024-10-01 DIAGNOSIS — R93.1 ELEVATED CORONARY ARTERY CALCIUM SCORE: ICD-10-CM

## 2024-10-02 RX ORDER — VENLAFAXINE HYDROCHLORIDE 75 MG/1
75 CAPSULE, EXTENDED RELEASE ORAL DAILY
Qty: 90 CAPSULE | Refills: 0 | Status: SHIPPED | OUTPATIENT
Start: 2024-10-02 | End: 2024-12-31

## 2024-10-02 RX ORDER — BUPROPION HYDROCHLORIDE 100 MG/1
100 TABLET, EXTENDED RELEASE ORAL DAILY
Qty: 90 TABLET | Refills: 0 | Status: SHIPPED | OUTPATIENT
Start: 2024-10-02

## 2024-10-02 RX ORDER — LISINOPRIL 30 MG/1
TABLET ORAL
Qty: 90 TABLET | Refills: 0 | Status: SHIPPED | OUTPATIENT
Start: 2024-10-02

## 2024-10-04 ENCOUNTER — TELEPHONE (OUTPATIENT)
Dept: PRIMARY CARE | Facility: CLINIC | Age: 59
End: 2024-10-04
Payer: MEDICARE

## 2024-10-04 DIAGNOSIS — E78.5 HYPERLIPIDEMIA, UNSPECIFIED HYPERLIPIDEMIA TYPE: ICD-10-CM

## 2024-10-04 DIAGNOSIS — I10 HYPERTENSION, UNSPECIFIED TYPE: ICD-10-CM

## 2024-10-04 DIAGNOSIS — R93.1 ELEVATED CORONARY ARTERY CALCIUM SCORE: ICD-10-CM

## 2024-10-04 RX ORDER — ATORVASTATIN CALCIUM 40 MG/1
40 TABLET, FILM COATED ORAL DAILY
Qty: 90 TABLET | Refills: 0 | Status: SHIPPED | OUTPATIENT
Start: 2024-10-04 | End: 2025-01-02

## 2024-10-04 RX ORDER — FENOFIBRATE 48 MG/1
48 TABLET, FILM COATED ORAL DAILY
Qty: 90 TABLET | Refills: 0 | Status: SHIPPED | OUTPATIENT
Start: 2024-10-04 | End: 2025-01-02

## 2024-10-04 RX ORDER — HYDROCHLOROTHIAZIDE 25 MG/1
TABLET ORAL
Qty: 90 TABLET | Refills: 0 | Status: SHIPPED | OUTPATIENT
Start: 2024-10-04

## 2024-10-04 NOTE — TELEPHONE ENCOUNTER
Patient stopped in to request refills of the following to go to [facility] on [address]:    Fenofibrate 48 mg  Atorvastatin 40 mg  HCTC 25 mg

## 2024-12-16 DIAGNOSIS — F41.9 ANXIETY AND DEPRESSION: ICD-10-CM

## 2024-12-16 DIAGNOSIS — F32.A ANXIETY AND DEPRESSION: ICD-10-CM

## 2024-12-16 RX ORDER — BUPROPION HYDROCHLORIDE 100 MG/1
100 TABLET, EXTENDED RELEASE ORAL DAILY
Qty: 90 TABLET | Refills: 0 | Status: SHIPPED | OUTPATIENT
Start: 2024-12-16

## 2024-12-21 DIAGNOSIS — I10 HYPERTENSION, UNSPECIFIED TYPE: ICD-10-CM

## 2024-12-21 DIAGNOSIS — N95.1 VASOMOTOR SYMPTOMS DUE TO MENOPAUSE: ICD-10-CM

## 2024-12-21 DIAGNOSIS — F32.5 MAJOR DEPRESSIVE DISORDER IN REMISSION, UNSPECIFIED WHETHER RECURRENT (CMS-HCC): ICD-10-CM

## 2024-12-21 DIAGNOSIS — E78.5 HYPERLIPIDEMIA, UNSPECIFIED HYPERLIPIDEMIA TYPE: ICD-10-CM

## 2024-12-21 DIAGNOSIS — R93.1 ELEVATED CORONARY ARTERY CALCIUM SCORE: ICD-10-CM

## 2024-12-23 RX ORDER — LISINOPRIL 30 MG/1
TABLET ORAL
Qty: 90 TABLET | Refills: 0 | Status: SHIPPED | OUTPATIENT
Start: 2024-12-23

## 2024-12-23 RX ORDER — FENOFIBRATE 48 MG/1
48 TABLET, FILM COATED ORAL DAILY
Qty: 90 TABLET | Refills: 0 | Status: SHIPPED | OUTPATIENT
Start: 2024-12-23 | End: 2025-03-23

## 2024-12-23 RX ORDER — VENLAFAXINE HYDROCHLORIDE 75 MG/1
75 CAPSULE, EXTENDED RELEASE ORAL DAILY
Qty: 90 CAPSULE | Refills: 0 | Status: SHIPPED | OUTPATIENT
Start: 2024-12-23 | End: 2025-03-23

## 2025-01-07 ENCOUNTER — LAB (OUTPATIENT)
Dept: LAB | Facility: LAB | Age: 60
End: 2025-01-07
Payer: MEDICARE

## 2025-01-07 DIAGNOSIS — R73.03 PRE-DIABETES: ICD-10-CM

## 2025-01-07 DIAGNOSIS — I10 ESSENTIAL HYPERTENSION: ICD-10-CM

## 2025-01-07 DIAGNOSIS — E78.00 HYPERCHOLESTEROLEMIA: ICD-10-CM

## 2025-01-07 LAB
ALBUMIN SERPL BCP-MCNC: 4.2 G/DL (ref 3.4–5)
ALP SERPL-CCNC: 52 U/L (ref 33–136)
ALT SERPL W P-5'-P-CCNC: 23 U/L (ref 7–45)
AMORPH CRY #/AREA UR COMP ASSIST: ABNORMAL /HPF
ANION GAP SERPL CALCULATED.3IONS-SCNC: 9 MMOL/L (ref 10–20)
APPEARANCE UR: ABNORMAL
AST SERPL W P-5'-P-CCNC: 17 U/L (ref 9–39)
BILIRUB SERPL-MCNC: 0.4 MG/DL (ref 0–1.2)
BILIRUB UR STRIP.AUTO-MCNC: NEGATIVE MG/DL
BUN SERPL-MCNC: 17 MG/DL (ref 6–23)
CALCIUM SERPL-MCNC: 9.7 MG/DL (ref 8.6–10.3)
CAOX CRY #/AREA UR COMP ASSIST: ABNORMAL /HPF
CHLORIDE SERPL-SCNC: 103 MMOL/L (ref 98–107)
CHOLEST SERPL-MCNC: 151 MG/DL (ref 0–199)
CHOLEST/HDLC SERPL: 3.4 {RATIO}
CO2 SERPL-SCNC: 32 MMOL/L (ref 21–32)
COLOR UR: YELLOW
CREAT SERPL-MCNC: 0.95 MG/DL (ref 0.5–1.05)
EGFRCR SERPLBLD CKD-EPI 2021: 69 ML/MIN/1.73M*2
EST. AVERAGE GLUCOSE BLD GHB EST-MCNC: 114 MG/DL
GLUCOSE SERPL-MCNC: 85 MG/DL (ref 74–99)
GLUCOSE UR STRIP.AUTO-MCNC: NORMAL MG/DL
HBA1C MFR BLD: 5.6 %
HDLC SERPL-MCNC: 44.5 MG/DL
KETONES UR STRIP.AUTO-MCNC: NEGATIVE MG/DL
LDLC SERPL CALC-MCNC: 77 MG/DL
LEUKOCYTE ESTERASE UR QL STRIP.AUTO: NEGATIVE
MUCOUS THREADS #/AREA URNS AUTO: ABNORMAL /LPF
NITRITE UR QL STRIP.AUTO: NEGATIVE
NON HDL CHOLESTEROL: 107 MG/DL (ref 0–149)
PH UR STRIP.AUTO: 6.5 [PH]
POTASSIUM SERPL-SCNC: 4.1 MMOL/L (ref 3.5–5.3)
PROT SERPL-MCNC: 6.6 G/DL (ref 6.4–8.2)
PROT UR STRIP.AUTO-MCNC: NEGATIVE MG/DL
RBC # UR STRIP.AUTO: ABNORMAL /UL
RBC #/AREA URNS AUTO: ABNORMAL /HPF
SODIUM SERPL-SCNC: 140 MMOL/L (ref 136–145)
SP GR UR STRIP.AUTO: 1.02
TRIGL SERPL-MCNC: 149 MG/DL (ref 0–149)
UROBILINOGEN UR STRIP.AUTO-MCNC: NORMAL MG/DL
VLDL: 30 MG/DL (ref 0–40)
WBC #/AREA URNS AUTO: ABNORMAL /HPF

## 2025-01-07 PROCEDURE — 80053 COMPREHEN METABOLIC PANEL: CPT

## 2025-01-07 PROCEDURE — 81001 URINALYSIS AUTO W/SCOPE: CPT

## 2025-01-07 PROCEDURE — 83036 HEMOGLOBIN GLYCOSYLATED A1C: CPT

## 2025-01-07 PROCEDURE — 80061 LIPID PANEL: CPT

## 2025-01-10 ENCOUNTER — APPOINTMENT (OUTPATIENT)
Dept: PRIMARY CARE | Facility: CLINIC | Age: 60
End: 2025-01-10
Payer: MEDICARE

## 2025-01-10 ENCOUNTER — TELEPHONE (OUTPATIENT)
Dept: PRIMARY CARE | Facility: CLINIC | Age: 60
End: 2025-01-10

## 2025-01-10 VITALS
DIASTOLIC BLOOD PRESSURE: 78 MMHG | WEIGHT: 151 LBS | BODY MASS INDEX: 28.53 KG/M2 | HEART RATE: 68 BPM | SYSTOLIC BLOOD PRESSURE: 112 MMHG | OXYGEN SATURATION: 100 %

## 2025-01-10 DIAGNOSIS — R31.29 HEMATURIA, MICROSCOPIC: ICD-10-CM

## 2025-01-10 DIAGNOSIS — Z00.00 GENERAL MEDICAL EXAM: ICD-10-CM

## 2025-01-10 DIAGNOSIS — R73.03 PRE-DIABETES: ICD-10-CM

## 2025-01-10 DIAGNOSIS — I10 ESSENTIAL HYPERTENSION: Primary | ICD-10-CM

## 2025-01-10 DIAGNOSIS — F32.5 MAJOR DEPRESSIVE DISORDER IN REMISSION, UNSPECIFIED WHETHER RECURRENT (CMS-HCC): ICD-10-CM

## 2025-01-10 DIAGNOSIS — N95.1 VASOMOTOR SYMPTOMS DUE TO MENOPAUSE: ICD-10-CM

## 2025-01-10 DIAGNOSIS — Z12.31 SCREENING MAMMOGRAM FOR BREAST CANCER: ICD-10-CM

## 2025-01-10 DIAGNOSIS — E78.00 HYPERCHOLESTEROLEMIA: ICD-10-CM

## 2025-01-10 DIAGNOSIS — R73.03 PRE-DIABETES: Primary | ICD-10-CM

## 2025-01-10 DIAGNOSIS — I10 ESSENTIAL HYPERTENSION: ICD-10-CM

## 2025-01-10 PROCEDURE — 99214 OFFICE O/P EST MOD 30 MIN: CPT

## 2025-01-10 PROCEDURE — 3074F SYST BP LT 130 MM HG: CPT

## 2025-01-10 PROCEDURE — 3078F DIAST BP <80 MM HG: CPT

## 2025-01-10 PROCEDURE — 1036F TOBACCO NON-USER: CPT

## 2025-01-10 RX ORDER — HYDROCHLOROTHIAZIDE 25 MG/1
25 TABLET ORAL DAILY
Qty: 90 TABLET | Refills: 1 | Status: SHIPPED | OUTPATIENT
Start: 2025-01-10 | End: 2025-07-09

## 2025-01-10 RX ORDER — ATORVASTATIN CALCIUM 40 MG/1
40 TABLET, FILM COATED ORAL DAILY
Qty: 90 TABLET | Refills: 1 | Status: SHIPPED | OUTPATIENT
Start: 2025-01-10 | End: 2025-07-09

## 2025-01-10 ASSESSMENT — PATIENT HEALTH QUESTIONNAIRE - PHQ9
2. FEELING DOWN, DEPRESSED OR HOPELESS: NOT AT ALL
SUM OF ALL RESPONSES TO PHQ9 QUESTIONS 1 AND 2: 0
1. LITTLE INTEREST OR PLEASURE IN DOING THINGS: NOT AT ALL

## 2025-01-10 ASSESSMENT — PAIN SCALES - GENERAL: PAINLEVEL_OUTOF10: 0-NO PAIN

## 2025-01-10 NOTE — ASSESSMENT & PLAN NOTE
Chronic. Continue Lisinopril 30mg, hydrochlorothiazide 25mg. DASH diet and 30 minutes of exercise 5x/week. Check home BP and keep log. Recheck in 6 months.     Orders:    hydroCHLOROthiazide (HYDRODiuril) 25 mg tablet; Take 1 tablet (25 mg) by mouth once daily. Take one po  daily

## 2025-01-10 NOTE — PROGRESS NOTES
Subjective   Patient ID: Elizabeth Milligan is a 59 y.o. female who presents for Hypertension, Hyperlipidemia, and Depression.    HPI   HTN: On Lisinopril 30mg, hydrochlorothiazide 25mg. No medication side effects. Does not check home BP. Denies chest pain, heart palpitations, SOB, dizziness, headaches, changes of vision, syncope, leg swelling.      HLD: On Atorvastatin 40mg, Fenofibrate 48mg. Tolerating well. Recent LDL 77.      Depression: Takes Effexor XR 75mg, Wellbutrin 100mg. Has noticed some improvement in hot flashes, although they seem to be increasing. Denies SI, HI, self-harm.     Pre-diabetes: On no medications. Recent A1c 5.6.     Review of Systems  All other systems have been reviewed and are negative except as noted in the HPI.     Objective   /78   Pulse 68   Wt 68.5 kg (151 lb)   SpO2 100%   BMI 28.53 kg/m²     Physical Exam  Vitals and nursing note reviewed.   Constitutional:       General: She is not in acute distress.     Appearance: Normal appearance.   Eyes:      Extraocular Movements: Extraocular movements intact.      Conjunctiva/sclera: Conjunctivae normal.   Neck:      Vascular: No carotid bruit.   Cardiovascular:      Rate and Rhythm: Normal rate and regular rhythm.   Pulmonary:      Effort: Pulmonary effort is normal.      Breath sounds: Normal breath sounds.   Musculoskeletal:      Cervical back: Neck supple.      Right lower leg: No edema.      Left lower leg: No edema.   Neurological:      General: No focal deficit present.      Mental Status: She is alert.   Psychiatric:         Mood and Affect: Mood normal.       Assessment/Plan   Assessment & Plan  Essential hypertension  Chronic. Continue Lisinopril 30mg, hydrochlorothiazide 25mg. DASH diet and 30 minutes of exercise 5x/week. Check home BP and keep log. Recheck in 6 months.     Orders:    hydroCHLOROthiazide (HYDRODiuril) 25 mg tablet; Take 1 tablet (25 mg) by mouth once daily. Take one po  daily    Major depressive  disorder in remission, unspecified whether recurrent (CMS-HCC)  Chronic, stable. Continue Effexor XR 75mg, Wellbutrin 100mg.        Hypercholesterolemia  Chronic. Continue Atorvastatin 40mg, Fenofibrate 48mg. Decrease fast food, fried food, processed foods and large amounts of red meat. Increase lean protein, vegetables and exercise. Recheck in 6 months.     Orders:    atorvastatin (Lipitor) 40 mg tablet; Take 1 tablet (40 mg) by mouth once daily.    Pre-diabetes  Chronic, improving. Keep off medication at this time. Low carbohydrate diet and increase in activity. Recheck in 6 months.          Vasomotor symptoms due to menopause  Chronic, needs better control. Continue Effexor XR 75mg daily. Schedule follow up with GYN.        Hematuria, microscopic  Chronic. Discussed possible etiologies including atrophic vaginitis as patient reports vaginal dryness. Schedule follow up with GYN. We also discussed referral to urology. Patient declines at this time, would like to follow up with GYN first.        Screening mammogram for breast cancer    Orders:    BI mammo bilateral screening tomosynthesis; Future

## 2025-01-10 NOTE — ASSESSMENT & PLAN NOTE
Chronic, improving. Keep off medication at this time. Low carbohydrate diet and increase in activity. Recheck in 6 months.

## 2025-01-10 NOTE — ASSESSMENT & PLAN NOTE
Chronic. Continue Atorvastatin 40mg, Fenofibrate 48mg. Decrease fast food, fried food, processed foods and large amounts of red meat. Increase lean protein, vegetables and exercise. Recheck in 6 months.     Orders:    atorvastatin (Lipitor) 40 mg tablet; Take 1 tablet (40 mg) by mouth once daily.

## 2025-01-10 NOTE — ASSESSMENT & PLAN NOTE
Chronic. Discussed possible etiologies including atrophic vaginitis as patient reports vaginal dryness. Schedule follow up with GYN. We also discussed referral to urology. Patient declines at this time, would like to follow up with GYN first.

## 2025-01-23 ENCOUNTER — HOSPITAL ENCOUNTER (OUTPATIENT)
Dept: RADIOLOGY | Facility: CLINIC | Age: 60
Discharge: HOME | End: 2025-01-23
Payer: MEDICARE

## 2025-01-23 VITALS — HEIGHT: 61 IN | WEIGHT: 149 LBS | BODY MASS INDEX: 28.13 KG/M2

## 2025-01-23 DIAGNOSIS — Z12.31 SCREENING MAMMOGRAM FOR BREAST CANCER: ICD-10-CM

## 2025-01-23 PROCEDURE — 77063 BREAST TOMOSYNTHESIS BI: CPT

## 2025-01-24 ENCOUNTER — APPOINTMENT (OUTPATIENT)
Dept: RADIOLOGY | Facility: CLINIC | Age: 60
End: 2025-01-24
Payer: MEDICARE

## 2025-03-28 DIAGNOSIS — F32.5 MAJOR DEPRESSIVE DISORDER IN REMISSION, UNSPECIFIED WHETHER RECURRENT: ICD-10-CM

## 2025-03-28 DIAGNOSIS — I10 HYPERTENSION, UNSPECIFIED TYPE: ICD-10-CM

## 2025-03-28 DIAGNOSIS — R93.1 ELEVATED CORONARY ARTERY CALCIUM SCORE: ICD-10-CM

## 2025-03-28 DIAGNOSIS — N95.1 VASOMOTOR SYMPTOMS DUE TO MENOPAUSE: ICD-10-CM

## 2025-03-28 DIAGNOSIS — F41.9 ANXIETY AND DEPRESSION: ICD-10-CM

## 2025-03-28 DIAGNOSIS — F32.A ANXIETY AND DEPRESSION: ICD-10-CM

## 2025-03-28 RX ORDER — LISINOPRIL 30 MG/1
TABLET ORAL
Qty: 90 TABLET | Refills: 0 | Status: SHIPPED | OUTPATIENT
Start: 2025-03-28

## 2025-03-28 RX ORDER — BUPROPION HYDROCHLORIDE 100 MG/1
100 TABLET, EXTENDED RELEASE ORAL DAILY
Qty: 90 TABLET | Refills: 0 | Status: SHIPPED | OUTPATIENT
Start: 2025-03-28

## 2025-03-28 RX ORDER — VENLAFAXINE HYDROCHLORIDE 75 MG/1
75 CAPSULE, EXTENDED RELEASE ORAL DAILY
Qty: 90 CAPSULE | Refills: 0 | Status: SHIPPED | OUTPATIENT
Start: 2025-03-28 | End: 2025-06-26

## 2025-05-31 DIAGNOSIS — E78.5 HYPERLIPIDEMIA, UNSPECIFIED HYPERLIPIDEMIA TYPE: ICD-10-CM

## 2025-05-31 DIAGNOSIS — R93.1 ELEVATED CORONARY ARTERY CALCIUM SCORE: ICD-10-CM

## 2025-06-02 RX ORDER — FENOFIBRATE 48 MG/1
48 TABLET, FILM COATED ORAL DAILY
Qty: 90 TABLET | Refills: 0 | Status: SHIPPED | OUTPATIENT
Start: 2025-06-02 | End: 2025-08-31

## 2025-06-05 ENCOUNTER — APPOINTMENT (OUTPATIENT)
Dept: CARDIOLOGY | Facility: CLINIC | Age: 60
End: 2025-06-05
Payer: MEDICARE

## 2025-06-19 ENCOUNTER — APPOINTMENT (OUTPATIENT)
Dept: CARDIOLOGY | Facility: CLINIC | Age: 60
End: 2025-06-19
Payer: MEDICARE

## 2025-06-19 ENCOUNTER — PATIENT MESSAGE (OUTPATIENT)
Dept: PRIMARY CARE | Facility: CLINIC | Age: 60
End: 2025-06-19

## 2025-06-19 VITALS
SYSTOLIC BLOOD PRESSURE: 113 MMHG | WEIGHT: 141 LBS | OXYGEN SATURATION: 96 % | DIASTOLIC BLOOD PRESSURE: 76 MMHG | HEART RATE: 66 BPM | BODY MASS INDEX: 26.64 KG/M2

## 2025-06-19 DIAGNOSIS — K64.9 HEMORRHOIDS, UNSPECIFIED HEMORRHOID TYPE: ICD-10-CM

## 2025-06-19 DIAGNOSIS — R93.1 ELEVATED CORONARY ARTERY CALCIUM SCORE: ICD-10-CM

## 2025-06-19 DIAGNOSIS — E78.00 HYPERCHOLESTEROLEMIA: ICD-10-CM

## 2025-06-19 DIAGNOSIS — Z12.11 SCREENING FOR COLON CANCER: Primary | ICD-10-CM

## 2025-06-19 DIAGNOSIS — I10 ESSENTIAL HYPERTENSION: Primary | ICD-10-CM

## 2025-06-19 PROCEDURE — 3074F SYST BP LT 130 MM HG: CPT | Performed by: INTERNAL MEDICINE

## 2025-06-19 PROCEDURE — 3078F DIAST BP <80 MM HG: CPT | Performed by: INTERNAL MEDICINE

## 2025-06-19 PROCEDURE — 93005 ELECTROCARDIOGRAM TRACING: CPT | Performed by: INTERNAL MEDICINE

## 2025-06-19 PROCEDURE — 99212 OFFICE O/P EST SF 10 MIN: CPT | Performed by: INTERNAL MEDICINE

## 2025-06-19 PROCEDURE — 1036F TOBACCO NON-USER: CPT | Performed by: INTERNAL MEDICINE

## 2025-06-19 PROCEDURE — 99213 OFFICE O/P EST LOW 20 MIN: CPT | Performed by: INTERNAL MEDICINE

## 2025-06-19 ASSESSMENT — ENCOUNTER SYMPTOMS
VOMITING: 0
MEMORY LOSS: 0
COUGH: 0
HEADACHES: 0
DEPRESSION: 0
FALLS: 0
MYALGIAS: 0
BLOATING: 0
DIARRHEA: 0
CHILLS: 0
NAUSEA: 0
HEMATURIA: 0
HEMOPTYSIS: 0
CONSTIPATION: 0
DYSURIA: 0
ALTERED MENTAL STATUS: 0
FEVER: 0
WHEEZING: 0
ABDOMINAL PAIN: 0

## 2025-06-19 NOTE — PROGRESS NOTES
Chief Complaint   Patient presents with    Follow-up       HPI  60 yo F w/ h/o CAC, HTN, HPL, anx/dep, FHx CAD (mother, sisters) now here for cardiology FU. No chest pain. No dyspnea at rest. No WELLS. No orthopnea/PND. No palps. No LH/dizzy/syncope. No edema. No claudication. No cough.   She exercises at gym 3d/wk (TM, wgts) with no symptoms.  ECG 5/17: SR (73)   ECG 5/24: SR (72), ?LAE  ECG 6/25: SR (63), ?LAE  CCS 3/18: 222  CCS 5/20: 232  CCS 12/21: 289 (LM 0, , LCX 72, RCA 10), nl heart size, no peric eff, AsAo 3.0cm    Review of Systems   Constitutional: Negative for chills, fever and malaise/fatigue.   HENT:  Negative for hearing loss.    Eyes:  Negative for visual disturbance.   Respiratory:  Negative for cough, hemoptysis and wheezing.    Skin:  Negative for rash.   Musculoskeletal:  Negative for falls and myalgias.   Gastrointestinal:  Negative for bloating, abdominal pain, constipation, diarrhea, dysphagia, nausea and vomiting.   Genitourinary:  Negative for dysuria and hematuria.   Neurological:  Negative for headaches.   Psychiatric/Behavioral:  Negative for altered mental status, depression and memory loss.       Social History     Tobacco Use    Smoking status: Never    Smokeless tobacco: Never   Substance Use Topics    Alcohol use: Yes     Alcohol/week: 1.0 standard drink of alcohol     Types: 1 Cans of beer per week      Family History   Problem Relation Name Age of Onset    Heart disease Mother Brenda     Heart disease Sister Tisha     Heart disease Sister Manpreet     Heart disease Sister Maryjane       No Known Allergies     Current Outpatient Medications   Medication Instructions    aspirin 81 mg EC tablet 1 tablet, Daily    atorvastatin (LIPITOR) 40 mg, oral, Daily    buPROPion SR (WELLBUTRIN SR) 100 mg, oral, Daily    coenzyme Q-10 100 mg capsule Take by mouth.    fenofibrate (TRICOR) 48 mg, oral, Daily    hydroCHLOROthiazide (HYDRODIURIL) 25 mg, oral, Daily, Take one po  daily    lisinopril 30  mg tablet TAKE 1 TABLET BY MOUTH EVERY DAY    turmeric/turmeric ext/pepr ext (turmeric-turmeric ext-pepper) 500-3 mg capsule as directed    venlafaxine XR (EFFEXOR-XR) 75 mg, oral, Daily, Do not crush or chew.      Vitals:    06/19/25 1013   BP: 113/76   Pulse: 66   SpO2: 96%      Physical Exam  Constitutional:       Appearance: Normal appearance.   HENT:      Head: Normocephalic and atraumatic.      Nose: Nose normal.   Neck:      Vascular: No carotid bruit.   Cardiovascular:      Rate and Rhythm: Normal rate and regular rhythm.      Heart sounds: No murmur heard.  Pulmonary:      Effort: Pulmonary effort is normal.      Breath sounds: Normal breath sounds.   Abdominal:      Palpations: Abdomen is soft.      Tenderness: There is no abdominal tenderness.   Musculoskeletal:      Right lower leg: No edema.      Left lower leg: No edema.   Skin:     General: Skin is warm and dry.   Neurological:      General: No focal deficit present.      Mental Status: She is alert.   Psychiatric:         Mood and Affect: Mood normal.         Judgment: Judgment normal.        Results/Data  1/25 Cr 0.95, K 4.1, LFT nl, LDL 77, HDL 44, , Chol 151, hgba1c 5.6  6/24 hgba1c 5.8, TSH 2.44  11/22 Cr 0.9, K 4.1, LDL 70, HDL 46, , Chol 137  5/17 Cr 0.92, K 4.5, LFT nl, LDL 70, HDL 43, , Chol 150, HGB 13.5,     Assessment/Plan   60 yo F w/ h/o CAC, HTN, HPL, anx/dep, FHx CAD (mother, sisters). Doing well. No cardiac symptoms including with TM 3d/wk.   -continue ASA 81 qd  -continue Lisinopril 30 every day  -continue Hydrochlorothiazide 25 every day  -continue Atorva 40 every day -> goal LDL <70 (consider increase to 80mg; however, LDL usually is at 70 on 40mg)  -f/u 1 year (earlier if needed); pt prefers annual fu     Kai Tello MD

## 2025-06-21 LAB
ATRIAL RATE: 63 BPM
P AXIS: 56 DEGREES
P OFFSET: 215 MS
P ONSET: 163 MS
PR INTERVAL: 122 MS
Q ONSET: 224 MS
QRS COUNT: 10 BEATS
QRS DURATION: 78 MS
QT INTERVAL: 406 MS
QTC CALCULATION(BAZETT): 415 MS
QTC FREDERICIA: 412 MS
R AXIS: 62 DEGREES
T AXIS: 60 DEGREES
T OFFSET: 427 MS
VENTRICULAR RATE: 63 BPM

## 2025-06-23 DIAGNOSIS — R93.1 ELEVATED CORONARY ARTERY CALCIUM SCORE: ICD-10-CM

## 2025-06-23 DIAGNOSIS — I10 HYPERTENSION, UNSPECIFIED TYPE: ICD-10-CM

## 2025-06-23 DIAGNOSIS — F32.A ANXIETY AND DEPRESSION: ICD-10-CM

## 2025-06-23 DIAGNOSIS — N95.1 VASOMOTOR SYMPTOMS DUE TO MENOPAUSE: ICD-10-CM

## 2025-06-23 DIAGNOSIS — F41.9 ANXIETY AND DEPRESSION: ICD-10-CM

## 2025-06-23 DIAGNOSIS — F32.5 MAJOR DEPRESSIVE DISORDER IN REMISSION, UNSPECIFIED WHETHER RECURRENT: ICD-10-CM

## 2025-06-23 RX ORDER — LISINOPRIL 30 MG/1
30 TABLET ORAL DAILY
Qty: 90 TABLET | Refills: 0 | Status: SHIPPED | OUTPATIENT
Start: 2025-06-23

## 2025-06-23 RX ORDER — VENLAFAXINE HYDROCHLORIDE 75 MG/1
75 CAPSULE, EXTENDED RELEASE ORAL DAILY
Qty: 90 CAPSULE | Refills: 0 | Status: SHIPPED | OUTPATIENT
Start: 2025-06-23 | End: 2025-09-21

## 2025-06-23 RX ORDER — BUPROPION HYDROCHLORIDE 100 MG/1
100 TABLET, EXTENDED RELEASE ORAL DAILY
Qty: 90 TABLET | Refills: 0 | Status: SHIPPED | OUTPATIENT
Start: 2025-06-23

## 2025-07-11 ENCOUNTER — APPOINTMENT (OUTPATIENT)
Dept: PRIMARY CARE | Facility: CLINIC | Age: 60
End: 2025-07-11
Payer: MEDICARE

## 2025-07-11 ASSESSMENT — ENCOUNTER SYMPTOMS
VOMITING: 0
NUMBNESS: 0
COUGH: 0
HEMATURIA: 0
NAUSEA: 0
BLOOD IN STOOL: 0
RHINORRHEA: 0
SORE THROAT: 0
DYSURIA: 0
TROUBLE SWALLOWING: 0
SHORTNESS OF BREATH: 0
FEVER: 0
ARTHRALGIAS: 0
NERVOUS/ANXIOUS: 0
HEADACHES: 0
UNEXPECTED WEIGHT CHANGE: 0
CHILLS: 0
MYALGIAS: 0
ABDOMINAL PAIN: 0
WEAKNESS: 0
DYSPHORIC MOOD: 0

## 2025-07-11 NOTE — ASSESSMENT & PLAN NOTE
Chronic. Continue Atorvastatin 40mg, Fenofibrate 48mg.  Labs ordered, will follow-up with results.  Decrease fast food, fried food, processed foods and large amounts of red meat. Increase lean protein, vegetables and exercise. Recheck in 6 months.     Orders:    Lipid Panel; Future    Comprehensive metabolic panel; Future

## 2025-07-11 NOTE — PROGRESS NOTES
Subjective   Patient ID: Elizabeth Milligan is a 59 y.o. female who presents for Annual Exam.    HPI   Elizabeth Milligan is seen for her comprehensive physical exam. PMH, SH, FH, medications were reviewed and updated.     CHRONIC ISSUES:   HTN: On Lisinopril 30mg, hydrochlorothiazide 25mg. No medication side effects. Does not check home BP. Denies chest pain, heart palpitations, SOB, dizziness, headaches, changes of vision, syncope, leg swelling.     HLD: On Atorvastatin 40mg, Fenofibrate 48mg. Tolerating well. Denies chest pain, SOB, nausea, vomiting, myalgias.   Lab Results   Component Value Date    TRIG 149 01/07/2025    CHOL 151 01/07/2025    LDLCALC 77 01/07/2025    HDL 44.5 01/07/2025     The 10-year ASCVD risk score (Ned ESTRADA, et al., 2019) is: 3.7%    Values used to calculate the score:      Age: 59 years      Sex: Female      Is Non- : No      Diabetic: No      Tobacco smoker: No      Systolic Blood Pressure: 121 mmHg      Is BP treated: Yes      HDL Cholesterol: 44.5 mg/dL      Total Cholesterol: 151 mg/dL    Depression: Takes Effexor XR 75mg, Wellbutrin 100mg. Tolerating well. Denies SI, HI, self-harm.      Pre-diabetes: On no medications. Recent A1c 5.6.   Lab Results   Component Value Date    HGBA1C 5.6 01/07/2025    HGBA1C 5.8 (H) 06/22/2024     IMMUNIZATIONS:   Immunization History   Administered Date(s) Administered    Flu vaccine, quadrivalent, recombinant, preservative free, adult (FLUBLOK) 11/28/2022    Moderna SARS-CoV-2 Vaccination 04/12/2021, 05/10/2021   Tdap: Due   Shingles: Due   Prevnar 20: Due      LUNG CANCER SCREENING (50-77 y.o. with 20+ pack year hx & current smoker or quit <15yrs ago)  History - Tobacco Use - Hide Smokeless[1]    COLORECTAL SCREENING (From 44 or 10yrs younger than family diagnosis)  Last colonoscopy - 07/25  Next colonoscopy due - 2035   Relevant FHx - None    GYN  LMP - Unknown, denies vaginal bleeding   Last Pap - 03/23, NILM/-HPV  "  Next Pap due - 2028, sees GYN     MAMMOGRAM SCREENING (From age 39)   Last mammogram - 01/25  Next mammogram due - 01/26    UTD on eye exam, wears glasses     Review of Systems   Constitutional:  Negative for chills, fever and unexpected weight change.   HENT:  Negative for congestion, ear pain, hearing loss, rhinorrhea, sore throat and trouble swallowing.    Eyes:  Negative for visual disturbance.   Respiratory:  Negative for cough and shortness of breath.    Cardiovascular:  Negative for chest pain and leg swelling.   Gastrointestinal:  Negative for abdominal pain, blood in stool, nausea and vomiting.   Genitourinary:  Negative for dysuria and hematuria.   Musculoskeletal:  Negative for arthralgias and myalgias.   Skin:  Negative for rash.   Neurological:  Negative for weakness, numbness and headaches.   Psychiatric/Behavioral:  Negative for dysphoric mood. The patient is not nervous/anxious.        Objective   /83   Pulse 79   Temp 35.7 °C (96.3 °F) (Temporal)   Ht 1.562 m (5' 1.5\")   Wt 64.2 kg (141 lb 9.6 oz)   SpO2 98%   BMI 26.32 kg/m²     Physical Exam  Vitals and nursing note reviewed.   Constitutional:       General: She is not in acute distress.     Appearance: Normal appearance.   HENT:      Right Ear: Tympanic membrane and ear canal normal.      Left Ear: Tympanic membrane and ear canal normal.      Nose: Nose normal.      Mouth/Throat:      Mouth: Mucous membranes are moist.   Eyes:      Extraocular Movements: Extraocular movements intact.      Conjunctiva/sclera: Conjunctivae normal.      Pupils: Pupils are equal, round, and reactive to light.   Neck:      Vascular: No carotid bruit.   Cardiovascular:      Rate and Rhythm: Normal rate and regular rhythm.      Pulses:           Dorsalis pedis pulses are 2+ on the right side and 2+ on the left side.   Pulmonary:      Effort: Pulmonary effort is normal.      Breath sounds: Normal breath sounds.   Abdominal:      General: Abdomen is flat. " Bowel sounds are normal.      Palpations: Abdomen is soft.      Tenderness: There is no abdominal tenderness.   Musculoskeletal:         General: Normal range of motion.      Cervical back: Neck supple.      Right lower leg: No edema.      Left lower leg: No edema.   Lymphadenopathy:      Cervical: No cervical adenopathy.   Skin:     General: Skin is warm.   Neurological:      General: No focal deficit present.      Mental Status: She is alert.   Psychiatric:         Mood and Affect: Mood normal.       Assessment/Plan   Assessment & Plan  Well adult exam  Preventative measures discussed. Labs ordered active, will follow up with results. Immunizations discussed, will check with insurance to see if vaccines are covered.     Orders:    POCT UA Automated manually resulted    Lipid Panel; Future    Comprehensive metabolic panel; Future    CBC and Auto Differential; Future    Hemoglobin A1c; Future    Tsh With Reflex To Free T4 If Abnormal; Future    Essential hypertension  Chronic. Continue Lisinopril 30mg, hydrochlorothiazide 25mg. DASH diet and 30 minutes of exercise 5x/week. Check home BP and keep log. Recheck in 6 months.     Orders:    Albumin-Creatinine Ratio, Urine Random    Hypercholesterolemia  Chronic. Continue Atorvastatin 40mg, Fenofibrate 48mg.  Labs ordered, will follow-up with results.  Decrease fast food, fried food, processed foods and large amounts of red meat. Increase lean protein, vegetables and exercise. Recheck in 6 months.     Orders:    Lipid Panel; Future    Comprehensive metabolic panel; Future    Pre-diabetes  Chronic.  Keep off medication at this time.  Labs ordered, will follow-up with results.  Low carbohydrate diet and increase in activity. Recheck in 6 months.     Orders:    Hemoglobin A1c; Future    Major depressive disorder in remission, unspecified whether recurrent  Chronic, stable.  Continue Effexor XR 75 mg daily, Wellbutrin  mg daily.  Recheck in 6 months.    Orders:    Tsh  With Reflex To Free T4 If Abnormal; Future    Hematuria, microscopic  Chronic.  Will place referral to urology for further evaluation and management.    Orders:    Referral to Urology; Future                [1]   Social History  Tobacco Use   Smoking Status Never   Smokeless Tobacco Never

## 2025-07-11 NOTE — ASSESSMENT & PLAN NOTE
Chronic. Continue Lisinopril 30mg, hydrochlorothiazide 25mg. DASH diet and 30 minutes of exercise 5x/week. Check home BP and keep log. Recheck in 6 months.     Orders:    Albumin-Creatinine Ratio, Urine Random

## 2025-07-11 NOTE — ASSESSMENT & PLAN NOTE
Chronic, stable.  Continue Effexor XR 75 mg daily, Wellbutrin  mg daily.  Recheck in 6 months.    Orders:    Tsh With Reflex To Free T4 If Abnormal; Future

## 2025-07-11 NOTE — ASSESSMENT & PLAN NOTE
Chronic.  Keep off medication at this time.  Labs ordered, will follow-up with results.  Low carbohydrate diet and increase in activity. Recheck in 6 months.     Orders:    Hemoglobin A1c; Future

## 2025-07-14 ENCOUNTER — APPOINTMENT (OUTPATIENT)
Dept: PRIMARY CARE | Facility: CLINIC | Age: 60
End: 2025-07-14
Payer: MEDICARE

## 2025-07-14 ENCOUNTER — TELEPHONE (OUTPATIENT)
Dept: PRIMARY CARE | Facility: CLINIC | Age: 60
End: 2025-07-14

## 2025-07-14 VITALS
DIASTOLIC BLOOD PRESSURE: 83 MMHG | SYSTOLIC BLOOD PRESSURE: 121 MMHG | HEART RATE: 79 BPM | HEIGHT: 62 IN | OXYGEN SATURATION: 98 % | TEMPERATURE: 96.3 F | BODY MASS INDEX: 26.06 KG/M2 | WEIGHT: 141.6 LBS

## 2025-07-14 DIAGNOSIS — I10 ESSENTIAL HYPERTENSION: ICD-10-CM

## 2025-07-14 DIAGNOSIS — E78.00 HYPERCHOLESTEROLEMIA: ICD-10-CM

## 2025-07-14 DIAGNOSIS — F32.5 MAJOR DEPRESSIVE DISORDER IN REMISSION, UNSPECIFIED WHETHER RECURRENT: ICD-10-CM

## 2025-07-14 DIAGNOSIS — R73.03 PRE-DIABETES: ICD-10-CM

## 2025-07-14 DIAGNOSIS — Z00.00 WELL ADULT EXAM: Primary | ICD-10-CM

## 2025-07-14 DIAGNOSIS — R31.29 HEMATURIA, MICROSCOPIC: ICD-10-CM

## 2025-07-14 LAB
POC APPEARANCE, URINE: CLEAR
POC BILIRUBIN, URINE: NEGATIVE
POC BLOOD, URINE: ABNORMAL
POC COLOR, URINE: YELLOW
POC GLUCOSE, URINE: NEGATIVE MG/DL
POC KETONES, URINE: NEGATIVE MG/DL
POC LEUKOCYTES, URINE: NEGATIVE
POC NITRITE,URINE: NEGATIVE
POC PH, URINE: 7 PH
POC PROTEIN, URINE: NEGATIVE MG/DL
POC SPECIFIC GRAVITY, URINE: 1.02
POC UROBILINOGEN, URINE: 0.2 EU/DL

## 2025-07-14 PROCEDURE — 1036F TOBACCO NON-USER: CPT

## 2025-07-14 PROCEDURE — 99214 OFFICE O/P EST MOD 30 MIN: CPT

## 2025-07-14 PROCEDURE — 3074F SYST BP LT 130 MM HG: CPT

## 2025-07-14 PROCEDURE — 3008F BODY MASS INDEX DOCD: CPT

## 2025-07-14 PROCEDURE — 81003 URINALYSIS AUTO W/O SCOPE: CPT

## 2025-07-14 PROCEDURE — 3079F DIAST BP 80-89 MM HG: CPT

## 2025-07-14 PROCEDURE — 99396 PREV VISIT EST AGE 40-64: CPT

## 2025-07-14 ASSESSMENT — PAIN SCALES - GENERAL: PAINLEVEL_OUTOF10: 0-NO PAIN

## 2025-07-14 ASSESSMENT — ENCOUNTER SYMPTOMS
LOSS OF SENSATION IN FEET: 0
DEPRESSION: 0
OCCASIONAL FEELINGS OF UNSTEADINESS: 0

## 2025-07-14 NOTE — ASSESSMENT & PLAN NOTE
Chronic.  Will place referral to urology for further evaluation and management.    Orders:    Referral to Urology; Future

## 2025-07-15 LAB
ALBUMIN/CREAT UR: 8 MG/G CREAT
CREAT UR-MCNC: 118 MG/DL (ref 20–275)
MICROALBUMIN UR-MCNC: 1 MG/DL

## 2025-07-18 LAB
ALBUMIN SERPL-MCNC: 4.5 G/DL (ref 3.6–5.1)
ALP SERPL-CCNC: 55 U/L (ref 37–153)
ALT SERPL-CCNC: 22 U/L (ref 6–29)
ANION GAP SERPL CALCULATED.4IONS-SCNC: 8 MMOL/L (CALC) (ref 7–17)
AST SERPL-CCNC: 21 U/L (ref 10–35)
BASOPHILS # BLD AUTO: 77 CELLS/UL (ref 0–200)
BASOPHILS NFR BLD AUTO: 0.8 %
BILIRUB SERPL-MCNC: 0.4 MG/DL (ref 0.2–1.2)
BUN SERPL-MCNC: 18 MG/DL (ref 7–25)
CALCIUM SERPL-MCNC: 10.1 MG/DL (ref 8.6–10.4)
CHLORIDE SERPL-SCNC: 101 MMOL/L (ref 98–110)
CHOLEST SERPL-MCNC: 152 MG/DL
CHOLEST/HDLC SERPL: 3 (CALC)
CO2 SERPL-SCNC: 31 MMOL/L (ref 20–32)
CREAT SERPL-MCNC: 0.96 MG/DL (ref 0.5–1.05)
EGFRCR SERPLBLD CKD-EPI 2021: 68 ML/MIN/1.73M2
EOSINOPHIL # BLD AUTO: 403 CELLS/UL (ref 15–500)
EOSINOPHIL NFR BLD AUTO: 4.2 %
ERYTHROCYTE [DISTWIDTH] IN BLOOD BY AUTOMATED COUNT: 14.1 % (ref 11–15)
EST. AVERAGE GLUCOSE BLD GHB EST-MCNC: 120 MG/DL
EST. AVERAGE GLUCOSE BLD GHB EST-SCNC: 6.6 MMOL/L
GLUCOSE SERPL-MCNC: 95 MG/DL (ref 65–99)
HBA1C MFR BLD: 5.8 %
HCT VFR BLD AUTO: 45.1 % (ref 35–45)
HDLC SERPL-MCNC: 50 MG/DL
HGB BLD-MCNC: 14.5 G/DL (ref 11.7–15.5)
LDLC SERPL CALC-MCNC: 77 MG/DL (CALC)
LYMPHOCYTES # BLD AUTO: 2755 CELLS/UL (ref 850–3900)
LYMPHOCYTES NFR BLD AUTO: 28.7 %
MCH RBC QN AUTO: 28.3 PG (ref 27–33)
MCHC RBC AUTO-ENTMCNC: 32.2 G/DL (ref 32–36)
MCV RBC AUTO: 88.1 FL (ref 80–100)
MONOCYTES # BLD AUTO: 528 CELLS/UL (ref 200–950)
MONOCYTES NFR BLD AUTO: 5.5 %
NEUTROPHILS # BLD AUTO: 5837 CELLS/UL (ref 1500–7800)
NEUTROPHILS NFR BLD AUTO: 60.8 %
NONHDLC SERPL-MCNC: 102 MG/DL (CALC)
PLATELET # BLD AUTO: 416 THOUSAND/UL (ref 140–400)
PMV BLD REES-ECKER: 11.6 FL (ref 7.5–12.5)
POTASSIUM SERPL-SCNC: 4.1 MMOL/L (ref 3.5–5.3)
PROT SERPL-MCNC: 7.2 G/DL (ref 6.1–8.1)
RBC # BLD AUTO: 5.12 MILLION/UL (ref 3.8–5.1)
SODIUM SERPL-SCNC: 140 MMOL/L (ref 135–146)
TRIGL SERPL-MCNC: 152 MG/DL
TSH SERPL-ACNC: 2.88 MIU/L (ref 0.4–4.5)
WBC # BLD AUTO: 9.6 THOUSAND/UL (ref 3.8–10.8)

## 2025-07-31 ENCOUNTER — APPOINTMENT (OUTPATIENT)
Dept: UROLOGY | Facility: CLINIC | Age: 60
End: 2025-07-31
Payer: MEDICARE

## 2025-07-31 VITALS — TEMPERATURE: 97 F

## 2025-07-31 DIAGNOSIS — R31.29 HEMATURIA, MICROSCOPIC: Primary | ICD-10-CM

## 2025-07-31 LAB
POC APPEARANCE, URINE: CLEAR
POC BILIRUBIN, URINE: NEGATIVE
POC BLOOD, URINE: ABNORMAL
POC COLOR, URINE: YELLOW
POC GLUCOSE, URINE: NEGATIVE MG/DL
POC KETONES, URINE: NEGATIVE MG/DL
POC LEUKOCYTES, URINE: NEGATIVE
POC NITRITE,URINE: NEGATIVE
POC PH, URINE: 6 PH
POC PROTEIN, URINE: NEGATIVE MG/DL
POC SPECIFIC GRAVITY, URINE: 1.02
POC UROBILINOGEN, URINE: 0.2 EU/DL

## 2025-07-31 PROCEDURE — 1036F TOBACCO NON-USER: CPT | Performed by: NURSE PRACTITIONER

## 2025-07-31 PROCEDURE — 51798 US URINE CAPACITY MEASURE: CPT | Performed by: NURSE PRACTITIONER

## 2025-07-31 PROCEDURE — 99203 OFFICE O/P NEW LOW 30 MIN: CPT | Performed by: NURSE PRACTITIONER

## 2025-07-31 PROCEDURE — 81003 URINALYSIS AUTO W/O SCOPE: CPT | Performed by: NURSE PRACTITIONER

## 2025-07-31 ASSESSMENT — PAIN SCALES - GENERAL: PAINLEVEL_OUTOF10: 0-NO PAIN

## 2025-07-31 NOTE — PROGRESS NOTES
"  [facility]  Outpatient Clinic Note    Subjective   Elizabeth Milligan is a 59 y.o. female    History of Present Illness   Patient presenting to clinic today NPV for evaluation of microscopic hematuria  EMR reviewed. History of HLD, HTN, Pre- DM, MDD, BERTHA, and chronic fatigue   Patient denies gross hematuria, dysuria, frequency, fevers, n/v, or flank pain.   No history of UTI or kidney stones   PVR 0 ml     No results found for: \"URINECULTURE\"    Past Medical History and Surgical History   Medical History[1]  Surgical History[2]    Medications  Medications Ordered Prior to Encounter[3]    Objective   Physicial Exam  General: Well developed, well nourished, alert and cooperative, appears in no acute distress  Eyes: Non-injected conjunctiva, sclera clear, no proptosis  Cardiac: Extremities are warm and well perfused. No edema, cyanosis or pallor.   Lungs: Breathing is easy, non-labored. Speaking in clear and complete sentences. Normal diaphragmatic movement.  MSK: Ambulatory with steady gait, unassisted  Neuro: alert and oriented to person, place and time  Psych: Demonstrates good judgement and reason, without hallucinations, abnormal affect or abnormal behaviors.  Skin: no obvious lesions, no rashes.    Office Visit on 07/14/2025   Component Date Value Ref Range Status    POC Color, Urine 07/14/2025 Yellow  Straw, Yellow, Light-Yellow Final    POC Appearance, Urine 07/14/2025 Clear  Clear Final    POC Glucose, Urine 07/14/2025 NEGATIVE  NEGATIVE mg/dl Final    POC Bilirubin, Urine 07/14/2025 NEGATIVE  NEGATIVE Final    POC Ketones, Urine 07/14/2025 NEGATIVE  NEGATIVE mg/dl Final    POC Specific Gravity, Urine 07/14/2025 1.020  1.005 - 1.035 Final    POC Blood, Urine 07/14/2025 TRACE-Intact (A)  NEGATIVE Final    POC PH, Urine 07/14/2025 7.0  No Reference Range Established PH Final    POC Protein, Urine 07/14/2025 NEGATIVE  NEGATIVE mg/dl Final    POC Urobilinogen, Urine 07/14/2025 0.2  0.2, 1.0 EU/DL Final "    Poc Nitrite, Urine 07/14/2025 NEGATIVE  NEGATIVE Final    POC Leukocytes, Urine 07/14/2025 NEGATIVE  NEGATIVE Final    CREATININE, RANDOM URINE 07/14/2025 118  20 - 275 mg/dL Final    ALBUMIN, URINE 07/14/2025 1.0  See Note: mg/dL Final    Comment: Reference Range:    Reference Range  Not established      ALBUMIN/CREATININE RATIO, RANDOM U* 07/14/2025 8  <30 mg/g creat Final    Comment:    The ADA defines abnormalities in albumin  excretion as follows:     Albuminuria Category        Result (mg/g creatinine)     Normal to Mildly increased   <30  Moderately increased            Severely increased           > OR = 300     The ADA recommends that at least two of three  specimens collected within a 3-6 month period be  abnormal before considering a patient to be  within a diagnostic category.      CHOLESTEROL, TOTAL 07/17/2025 152  <200 mg/dL Final    HDL CHOLESTEROL 07/17/2025 50  > OR = 50 mg/dL Final    TRIGLYCERIDES 07/17/2025 152 (H)  <150 mg/dL Final    LDL-CHOLESTEROL 07/17/2025 77  mg/dL (calc) Final    Comment: Reference range: <100     Desirable range <100 mg/dL for primary prevention;    <70 mg/dL for patients with CHD or diabetic patients   with > or = 2 CHD risk factors.     LDL-C is now calculated using the Gonzales-Elizondo   calculation, which is a validated novel method providing   better accuracy than the Friedewald equation in the   estimation of LDL-C.   Gonzales SS et al. ROSEMARIE. 2013;310(19): 1274-4823   (http://education.T-ZONE.Nantero/faq/OAO223)      CHOL/HDLC RATIO 07/17/2025 3.0  <5.0 (calc) Final    NON HDL CHOLESTEROL 07/17/2025 102  <130 mg/dL (calc) Final    Comment: For patients with diabetes plus 1 major ASCVD risk   factor, treating to a non-HDL-C goal of <100 mg/dL   (LDL-C of <70 mg/dL) is considered a therapeutic   option.      GLUCOSE 07/17/2025 95  65 - 99 mg/dL Final    Comment:               Fasting reference interval         UREA NITROGEN (BUN) 07/17/2025 18  7 - 25  mg/dL Final    CREATININE 07/17/2025 0.96  0.50 - 1.05 mg/dL Final    EGFR 07/17/2025 68  > OR = 60 mL/min/1.73m2 Final    SODIUM 07/17/2025 140  135 - 146 mmol/L Final    POTASSIUM 07/17/2025 4.1  3.5 - 5.3 mmol/L Final    CHLORIDE 07/17/2025 101  98 - 110 mmol/L Final    CARBON DIOXIDE 07/17/2025 31  20 - 32 mmol/L Final    ELECTROLYTE BALANCE 07/17/2025 8  7 - 17 mmol/L (calc) Final    CALCIUM 07/17/2025 10.1  8.6 - 10.4 mg/dL Final    PROTEIN, TOTAL 07/17/2025 7.2  6.1 - 8.1 g/dL Final    ALBUMIN 07/17/2025 4.5  3.6 - 5.1 g/dL Final    BILIRUBIN, TOTAL 07/17/2025 0.4  0.2 - 1.2 mg/dL Final    ALKALINE PHOSPHATASE 07/17/2025 55  37 - 153 U/L Final    AST 07/17/2025 21  10 - 35 U/L Final    ALT 07/17/2025 22  6 - 29 U/L Final    WHITE BLOOD CELL COUNT 07/17/2025 9.6  3.8 - 10.8 Thousand/uL Final    RED BLOOD CELL COUNT 07/17/2025 5.12 (H)  3.80 - 5.10 Million/uL Final    HEMOGLOBIN 07/17/2025 14.5  11.7 - 15.5 g/dL Final    HEMATOCRIT 07/17/2025 45.1 (H)  35.0 - 45.0 % Final    MCV 07/17/2025 88.1  80.0 - 100.0 fL Final    MCH 07/17/2025 28.3  27.0 - 33.0 pg Final    MCHC 07/17/2025 32.2  32.0 - 36.0 g/dL Final    Comment: For adults, a slight decrease in the calculated MCHC  value (in the range of 30 to 32 g/dL) is most likely  not clinically significant; however, it should be  interpreted with caution in correlation with other  red cell parameters and the patient's clinical  condition.      RDW 07/17/2025 14.1  11.0 - 15.0 % Final    PLATELET COUNT 07/17/2025 416 (H)  140 - 400 Thousand/uL Final    MPV 07/17/2025 11.6  7.5 - 12.5 fL Final    ABSOLUTE NEUTROPHILS 07/17/2025 5,837  1,500 - 7,800 cells/uL Final    ABSOLUTE LYMPHOCYTES 07/17/2025 2,755  850 - 3,900 cells/uL Final    ABSOLUTE MONOCYTES 07/17/2025 528  200 - 950 cells/uL Final    ABSOLUTE EOSINOPHILS 07/17/2025 403  15 - 500 cells/uL Final    ABSOLUTE BASOPHILS 07/17/2025 77  0 - 200 cells/uL Final    NEUTROPHILS 07/17/2025 60.8  % Final     LYMPHOCYTES 07/17/2025 28.7  % Final    MONOCYTES 07/17/2025 5.5  % Final    EOSINOPHILS 07/17/2025 4.2  % Final    BASOPHILS 07/17/2025 0.8  % Final    HEMOGLOBIN A1c 07/17/2025 5.8 (H)  <5.7 % Final    Comment: For someone without known diabetes, a hemoglobin   A1c value between 5.7% and 6.4% is consistent with  prediabetes and should be confirmed with a   follow-up test.     For someone with known diabetes, a value <7%  indicates that their diabetes is well controlled. A1c  targets should be individualized based on duration of  diabetes, age, comorbid conditions, and other  considerations.     This assay result is consistent with an increased risk  of diabetes.     Currently, no consensus exists regarding use of  hemoglobin A1c for diagnosis of diabetes for children.         eAG (mg/dL) 07/17/2025 120  mg/dL Final    eAG (mmol/L) 07/17/2025 6.6  mmol/L Final    TSH W/REFLEX TO FT4 07/17/2025 2.88  0.40 - 4.50 mIU/L Final      Review of Systems  All other systems have been reviewed and are negative for complaint.      Assessment and Plan     Discussed the potential etiologies for hematuria, including physiologic, anticoagulation, NSAIDS, infection, stones, masses/cysts, inflammation, CKD, nephritis, voiding dysfunction, foreign bodies, malignancies, etc     According to the [employer], microscopic hematuria is defined by the presence of three or more red blood cells per high-powered field on microscopic examination of one properly collected, non-contaminated urinalysis with no evidence of infection.    We will send your urine today for microscopic urinalysis and you will be contacted with the results. If microscopic urinalysis reveals true microscopic hematuria, we will plan for a diagnostic hematuria workup including: urine culture, cytology, CT Urogram and cystoscopy. Patient is aware of the risks associated with IV contrast. There is no history of renal dysfunction. Risks of cystoscopy were  discussed with the patient in great detail, including risk of hematuria, UTI and discomfort.    RTC 6 to 8 weeks to review imaging and cysto results.     All questions and concerns were addressed. Patient verbalizes understanding and has no other questions at this time.     Jia Bennett-- [facility]  Office Phone:  [phone]             [1]   Past Medical History:  Diagnosis Date    Atypical chest pain 2023    Depression 2006    Hypertension 2005    Keloid 2015    Memory change 2024    Vaginal dryness, menopausal 2024    Vaginal spotting 2024   [2]   Past Surgical History:  Procedure Laterality Date    BREAST SURGERY  2018    Breast Surgery Reduction Procedure     SECTION, CLASSIC  2018     Section     SECTION, LOW TRANSVERSE  2006    OTHER SURGICAL HISTORY  2018    Liposuction    REDUCTION MAMMAPLASTY  30 years   [3]   Current Outpatient Medications on File Prior to Visit   Medication Sig Dispense Refill    aspirin 81 mg EC tablet Take 1 tablet (81 mg) by mouth once daily.      atorvastatin (Lipitor) 40 mg tablet Take 1 tablet (40 mg) by mouth once daily. 90 tablet 1    buPROPion SR (Wellbutrin SR) 100 mg 12 hr tablet TAKE 1 TABLET (100 MG) BY MOUTH EARLY IN THE MORNING.. 90 tablet 0    coenzyme Q-10 100 mg capsule Take by mouth.      fenofibrate (Tricor) 48 mg tablet TAKE 1 TABLET (48 MG) BY MOUTH ONCE DAILY. 90 tablet 0    hydroCHLOROthiazide (HYDRODiuril) 25 mg tablet Take 1 tablet (25 mg) by mouth once daily. Take one po  daily 90 tablet 1    lisinopril 30 mg tablet TAKE 1 TABLET BY MOUTH EVERY DAY 90 tablet 0    turmeric/turmeric ext/pepr ext (turmeric-turmeric ext-pepper) 500-3 mg capsule as directed      venlafaxine XR (Effexor-XR) 75 mg 24 hr capsule TAKE 1 CAPSULE (75 MG) BY MOUTH ONCE DAILY. DO NOT CRUSH OR CHEW. 90 capsule 0     No current facility-administered medications on file prior to visit.

## 2025-08-02 LAB
APPEARANCE UR: CLEAR
BACTERIA #/AREA URNS HPF: ABNORMAL /HPF
BACTERIA UR CULT: NORMAL
BILIRUB UR QL STRIP: NEGATIVE
COLOR UR: YELLOW
GLUCOSE UR QL STRIP: NEGATIVE
HGB UR QL STRIP: ABNORMAL
HYALINE CASTS #/AREA URNS LPF: ABNORMAL /LPF
KETONES UR QL STRIP: NEGATIVE
LEUKOCYTE ESTERASE UR QL STRIP: NEGATIVE
NITRITE UR QL STRIP: NEGATIVE
PH UR STRIP: 6 [PH] (ref 5–8)
PROT UR QL STRIP: NEGATIVE
RBC #/AREA URNS HPF: ABNORMAL /HPF
SERVICE CMNT-IMP: ABNORMAL
SP GR UR STRIP: 1.02 (ref 1–1.03)
SQUAMOUS #/AREA URNS HPF: ABNORMAL /HPF
WBC #/AREA URNS HPF: ABNORMAL /HPF

## 2025-08-26 ENCOUNTER — APPOINTMENT (OUTPATIENT)
Dept: UROLOGY | Facility: CLINIC | Age: 60
End: 2025-08-26
Payer: MEDICARE

## 2025-08-26 VITALS
TEMPERATURE: 96.5 F | BODY MASS INDEX: 27.08 KG/M2 | HEART RATE: 66 BPM | HEIGHT: 61 IN | DIASTOLIC BLOOD PRESSURE: 64 MMHG | SYSTOLIC BLOOD PRESSURE: 99 MMHG | WEIGHT: 143.4 LBS

## 2025-08-26 DIAGNOSIS — R31.29 HEMATURIA, MICROSCOPIC: Primary | ICD-10-CM

## 2025-08-26 LAB
POC APPEARANCE, URINE: CLEAR
POC BILIRUBIN, URINE: NEGATIVE
POC BLOOD, URINE: ABNORMAL
POC COLOR, URINE: YELLOW
POC GLUCOSE, URINE: NEGATIVE MG/DL
POC KETONES, URINE: NEGATIVE MG/DL
POC LEUKOCYTES, URINE: ABNORMAL
POC NITRITE,URINE: NEGATIVE
POC PH, URINE: 7 PH
POC PROTEIN, URINE: NEGATIVE MG/DL
POC SPECIFIC GRAVITY, URINE: 1.01
POC UROBILINOGEN, URINE: 0.2 EU/DL

## 2025-08-26 PROCEDURE — 52000 CYSTOURETHROSCOPY: CPT | Performed by: UROLOGY

## 2025-08-26 PROCEDURE — 99203 OFFICE O/P NEW LOW 30 MIN: CPT | Performed by: UROLOGY

## 2025-08-26 PROCEDURE — 81002 URINALYSIS NONAUTO W/O SCOPE: CPT | Performed by: UROLOGY

## 2025-08-26 ASSESSMENT — PAIN SCALES - GENERAL: PAINLEVEL_OUTOF10: 0-NO PAIN

## 2025-08-28 DIAGNOSIS — R31.29 OTHER MICROSCOPIC HEMATURIA: Primary | ICD-10-CM

## 2025-08-29 ENCOUNTER — LAB (OUTPATIENT)
Dept: LAB | Facility: HOSPITAL | Age: 60
End: 2025-08-29
Payer: MEDICARE

## 2025-08-29 ENCOUNTER — APPOINTMENT (OUTPATIENT)
Dept: RADIOLOGY | Facility: HOSPITAL | Age: 60
End: 2025-08-29
Payer: MEDICARE

## 2025-08-29 DIAGNOSIS — R31.29 HEMATURIA, MICROSCOPIC: ICD-10-CM

## 2025-08-29 PROCEDURE — 2550000001 HC RX 255 CONTRASTS: Performed by: NURSE PRACTITIONER

## 2025-08-29 PROCEDURE — 76377 3D RENDER W/INTRP POSTPROCES: CPT

## 2025-08-29 RX ADMIN — IOHEXOL 75 ML: 350 INJECTION, SOLUTION INTRAVENOUS at 15:24

## 2025-09-03 ENCOUNTER — APPOINTMENT (OUTPATIENT)
Dept: UROLOGY | Facility: CLINIC | Age: 60
End: 2025-09-03
Payer: MEDICARE

## 2025-09-30 ENCOUNTER — APPOINTMENT (OUTPATIENT)
Dept: UROLOGY | Facility: CLINIC | Age: 60
End: 2025-09-30
Payer: MEDICARE

## 2026-01-14 ENCOUNTER — APPOINTMENT (OUTPATIENT)
Dept: PRIMARY CARE | Facility: CLINIC | Age: 61
End: 2026-01-14
Payer: MEDICARE